# Patient Record
Sex: FEMALE | Race: WHITE | NOT HISPANIC OR LATINO | Employment: UNEMPLOYED | URBAN - METROPOLITAN AREA
[De-identification: names, ages, dates, MRNs, and addresses within clinical notes are randomized per-mention and may not be internally consistent; named-entity substitution may affect disease eponyms.]

---

## 2017-11-27 ENCOUNTER — OFFICE VISIT (OUTPATIENT)
Dept: URGENT CARE | Facility: CLINIC | Age: 42
End: 2017-11-27
Payer: MEDICAID

## 2017-11-27 VITALS
DIASTOLIC BLOOD PRESSURE: 73 MMHG | TEMPERATURE: 99 F | HEART RATE: 94 BPM | OXYGEN SATURATION: 98 % | BODY MASS INDEX: 25.9 KG/M2 | RESPIRATION RATE: 18 BRPM | SYSTOLIC BLOOD PRESSURE: 123 MMHG | HEIGHT: 67 IN | WEIGHT: 165 LBS

## 2017-11-27 DIAGNOSIS — S93.602A FOOT SPRAIN, LEFT, INITIAL ENCOUNTER: Primary | ICD-10-CM

## 2017-11-27 PROCEDURE — 99214 OFFICE O/P EST MOD 30 MIN: CPT | Mod: S$GLB,,, | Performed by: FAMILY MEDICINE

## 2017-11-27 RX ORDER — METHOCARBAMOL 750 MG/1
500 TABLET, FILM COATED ORAL 4 TIMES DAILY
COMMUNITY
End: 2018-07-12

## 2017-11-27 RX ORDER — LOPERAMIDE HYDROCHLORIDE 2 MG/1
2 CAPSULE ORAL 4 TIMES DAILY PRN
COMMUNITY
End: 2018-07-12 | Stop reason: SDUPTHER

## 2017-11-27 RX ORDER — PROMETHAZINE HYDROCHLORIDE 25 MG/1
25 TABLET ORAL EVERY 4 HOURS
COMMUNITY
End: 2018-10-03

## 2017-11-27 RX ORDER — IBUPROFEN 800 MG/1
800 TABLET ORAL EVERY 8 HOURS PRN
Qty: 60 TABLET | Refills: 2 | Status: SHIPPED | OUTPATIENT
Start: 2017-11-27 | End: 2018-10-03

## 2017-11-27 RX ORDER — DICYCLOMINE HYDROCHLORIDE 20 MG/1
20 TABLET ORAL EVERY 6 HOURS
COMMUNITY
End: 2018-07-12 | Stop reason: SDUPTHER

## 2017-11-27 RX ORDER — CYCLOBENZAPRINE HCL 10 MG
10 TABLET ORAL 3 TIMES DAILY PRN
COMMUNITY
End: 2018-07-12

## 2017-11-27 RX ORDER — GABAPENTIN 400 MG/1
400 CAPSULE ORAL 3 TIMES DAILY
COMMUNITY
End: 2018-07-12 | Stop reason: SDUPTHER

## 2017-11-27 RX ORDER — BACLOFEN 10 MG/1
10 TABLET ORAL 3 TIMES DAILY
COMMUNITY
End: 2018-07-12

## 2017-11-27 RX ORDER — NAPROXEN 500 MG/1
500 TABLET ORAL 2 TIMES DAILY
COMMUNITY
End: 2018-07-12

## 2017-11-27 NOTE — PATIENT INSTRUCTIONS
Return to clinic for a Xray as discussed.    Foot Sprain    A sprain is a stretching or tearing of the ligaments that hold a joint together. There are no broken bones. Sprains generally take from 3-6 weeks to heal. A sprain may be treated with a splint, walking cast, or special boot. Mild sprains may not need any additional support.  Home care  The following guidelines will help you care for your injury at home:  · Keep your leg elevated when sitting or lying down. This is very important during the first 48 hours to reduce swelling. Stay off the injured foot as much as possible until you can walk on it without pain. If needed, you may use crutches during the first week for this purpose. Crutches can be rented at many pharmacies or surgical/orthopedic supply stores.  · You may be given a cast shoe to wear to prevent movement in your foot. If not, you can use a sandal or any shoe that does not put pressure on the injured area until the swelling and pain go away. If using a sandal, be careful not to hit your foot against anything, since another injury could make the sprain worse.  · Apply an ice pack over the injured area for 15 to 20 minutes every 3 to 6 hours. You should do this for the first 24 to 48 hours. You can make an ice pack by filling a plastic bag that seals at the top with ice cubes and then wrapping it with a thin towel. Continue to use ice packs for relief of pain and swelling as needed. As the ice melts, avoid getting any wrap, splint, or cast wet. After 48 hours, apply heat from a warm shower or bath for 20 minutes several times daily. Alternating ice and heat may also be helpful.  · You may use over-the-counter pain medicine to control pain, unless another medicine was prescribed. If you have chronic liver or kidney disease or ever had a stomach ulcer or GI bleeding, talk with your healthcare provider before using these medicines.  · If you were given a splint or cast, keep it dry. Bathe with your  splint or cast well out of the water, protected with 2 large plastic bags, rubber-banded at the top end. If a fiberglass splint or cast gets wet, you can dry it with a hair dryer.  · You may return to sports after healing, when you can run without pain.  Follow-up care  Follow up with your healthcare provider as directed. Sometimes fractures dont show up on the first X-ray. Bruises and sprains can sometimes hurt as much as a fracture. These injuries can take time to heal completely. If your symptoms dont improve or they get worse, talk with your healthcare provider. You may need a repeat X-ray.  When to seek medical advice  Call your healthcare provider right away if any of these occur:  · The plaster cast or splint gets wet or soft  · The fiberglass cast or splint gets wet and does not dry for 24 hours  · Pain or swelling increases, or redness appears  · A bad odor comes from within the cast  · Fever of 100.4°F (38°C) or above lasting for 24 to 48 hours  · Toes on the injured foot become cold, blue, numb, or tingly  Date Last Reviewed: 11/20/2015  © 9140-5055 [employer]. [address] [place] [address], [place] [address]. All rights reserved. This information is not intended as a substitute for professional medical care. Always follow your healthcare professional's instructions.

## 2017-11-27 NOTE — PROGRESS NOTES
"Subjective:       Patient ID: Brissa Dietz is a 42 y.o. female.    Vitals:  height is 5' 7" (1.702 m) and weight is 74.8 kg (165 lb). Her temperature is 98.5 °F (36.9 °C). Her blood pressure is 123/73 and her pulse is 94. Her respiration is 18 and oxygen saturation is 98%.     Chief Complaint: Foot Injury    Initially twisted foot/ankle a couple weeks ago but re-injured left foot last night. Unable to bear weight since last night.      Foot Injury    The incident occurred more than 1 week ago. The incident occurred at home. The pain is present in the left foot. The pain is at a severity of 6/10. The pain is mild. The pain has been constant since onset. Pertinent negatives include no numbness. She reports no foreign bodies present. The symptoms are aggravated by movement, palpation and weight bearing. She has tried NSAIDs for the symptoms. The treatment provided no relief.     Review of Systems   Constitution: Negative for weakness and malaise/fatigue.   HENT: Negative for nosebleeds.    Cardiovascular: Negative for chest pain and syncope.   Respiratory: Negative for shortness of breath.    Musculoskeletal: Positive for joint pain and joint swelling. Negative for back pain and neck pain.   Gastrointestinal: Negative for abdominal pain.   Genitourinary: Negative for hematuria.   Neurological: Negative for dizziness and numbness.       Objective:      Physical Exam   Constitutional: She appears well-developed and well-nourished.   HENT:   Head: Normocephalic.   Mouth/Throat: Oropharynx is clear and moist.   Eyes: Pupils are equal, round, and reactive to light.   Neck: Normal range of motion.   Cardiovascular: Normal rate and regular rhythm.    Pulmonary/Chest: Effort normal.   Musculoskeletal: She exhibits tenderness.        Right foot: There is laceration.        Left foot: There is decreased range of motion, tenderness and swelling. There is no laceration.        Feet:        Assessment:       1. Foot sprain, " left, initial encounter        Plan:         Foot sprain, left, initial encounter  -     ibuprofen (ADVIL,MOTRIN) 800 MG tablet; Take 1 tablet (800 mg total) by mouth every 8 (eight) hours as needed for Pain.  Dispense: 60 tablet; Refill: 2  -     Cancel: X-Ray Foot 2 View Left; Future; Expected date: 11/27/2017      Patient Instructions     Return to clinic for a Xray as discussed.    Foot Sprain    A sprain is a stretching or tearing of the ligaments that hold a joint together. There are no broken bones. Sprains generally take from 3-6 weeks to heal. A sprain may be treated with a splint, walking cast, or special boot. Mild sprains may not need any additional support.  Home care  The following guidelines will help you care for your injury at home:  · Keep your leg elevated when sitting or lying down. This is very important during the first 48 hours to reduce swelling. Stay off the injured foot as much as possible until you can walk on it without pain. If needed, you may use crutches during the first week for this purpose. Crutches can be rented at many pharmacies or surgical/orthopedic supply stores.  · You may be given a cast shoe to wear to prevent movement in your foot. If not, you can use a sandal or any shoe that does not put pressure on the injured area until the swelling and pain go away. If using a sandal, be careful not to hit your foot against anything, since another injury could make the sprain worse.  · Apply an ice pack over the injured area for 15 to 20 minutes every 3 to 6 hours. You should do this for the first 24 to 48 hours. You can make an ice pack by filling a plastic bag that seals at the top with ice cubes and then wrapping it with a thin towel. Continue to use ice packs for relief of pain and swelling as needed. As the ice melts, avoid getting any wrap, splint, or cast wet. After 48 hours, apply heat from a warm shower or bath for 20 minutes several times daily. Alternating ice and heat may  also be helpful.  · You may use over-the-counter pain medicine to control pain, unless another medicine was prescribed. If you have chronic liver or kidney disease or ever had a stomach ulcer or GI bleeding, talk with your healthcare provider before using these medicines.  · If you were given a splint or cast, keep it dry. Bathe with your splint or cast well out of the water, protected with 2 large plastic bags, rubber-banded at the top end. If a fiberglass splint or cast gets wet, you can dry it with a hair dryer.  · You may return to sports after healing, when you can run without pain.  Follow-up care  Follow up with your healthcare provider as directed. Sometimes fractures dont show up on the first X-ray. Bruises and sprains can sometimes hurt as much as a fracture. These injuries can take time to heal completely. If your symptoms dont improve or they get worse, talk with your healthcare provider. You may need a repeat X-ray.  When to seek medical advice  Call your healthcare provider right away if any of these occur:  · The plaster cast or splint gets wet or soft  · The fiberglass cast or splint gets wet and does not dry for 24 hours  · Pain or swelling increases, or redness appears  · A bad odor comes from within the cast  · Fever of 100.4°F (38°C) or above lasting for 24 to 48 hours  · Toes on the injured foot become cold, blue, numb, or tingly  Date Last Reviewed: 11/20/2015  © 1263-1791 [employer]. [address] [place] [address], [place] [address]. All rights reserved. This information is not intended as a substitute for professional medical care. Always follow your healthcare professional's instructions.

## 2017-11-29 ENCOUNTER — TELEPHONE (OUTPATIENT)
Dept: URGENT CARE | Facility: CLINIC | Age: 42
End: 2017-11-29

## 2018-07-12 ENCOUNTER — OFFICE VISIT (OUTPATIENT)
Dept: FAMILY MEDICINE | Facility: HOSPITAL | Age: 43
End: 2018-07-12
Attending: FAMILY MEDICINE
Payer: MEDICAID

## 2018-07-12 VITALS
BODY MASS INDEX: 25.71 KG/M2 | HEIGHT: 67 IN | DIASTOLIC BLOOD PRESSURE: 70 MMHG | SYSTOLIC BLOOD PRESSURE: 115 MMHG | HEART RATE: 63 BPM | WEIGHT: 163.81 LBS

## 2018-07-12 DIAGNOSIS — Z00.00 HEALTHCARE MAINTENANCE: ICD-10-CM

## 2018-07-12 DIAGNOSIS — M54.12 CERVICAL RADICULOPATHY: Primary | ICD-10-CM

## 2018-07-12 DIAGNOSIS — F17.200 TOBACCO DEPENDENCE: ICD-10-CM

## 2018-07-12 DIAGNOSIS — K58.9 IRRITABLE BOWEL SYNDROME, UNSPECIFIED TYPE: ICD-10-CM

## 2018-07-12 DIAGNOSIS — M13.0 POLYARTHROPATHY: ICD-10-CM

## 2018-07-12 PROCEDURE — 99214 OFFICE O/P EST MOD 30 MIN: CPT | Performed by: FAMILY MEDICINE

## 2018-07-12 RX ORDER — VARENICLINE TARTRATE 0.5 (11)-1
KIT ORAL
Qty: 1 PACKAGE | Refills: 0 | Status: SHIPPED | OUTPATIENT
Start: 2018-07-12 | End: 2018-10-03

## 2018-07-12 RX ORDER — LOPERAMIDE HYDROCHLORIDE 2 MG/1
2 CAPSULE ORAL 4 TIMES DAILY PRN
Qty: 30 CAPSULE | Refills: 0 | Status: SHIPPED | OUTPATIENT
Start: 2018-07-12 | End: 2018-08-11

## 2018-07-12 RX ORDER — GABAPENTIN 400 MG/1
400 CAPSULE ORAL 3 TIMES DAILY
Qty: 90 CAPSULE | Refills: 0 | Status: SHIPPED | OUTPATIENT
Start: 2018-07-12 | End: 2018-08-13

## 2018-07-12 RX ORDER — DICYCLOMINE HYDROCHLORIDE 20 MG/1
20 TABLET ORAL EVERY 8 HOURS PRN
Qty: 30 TABLET | Refills: 0 | Status: SHIPPED | OUTPATIENT
Start: 2018-07-12 | End: 2018-08-11

## 2018-07-12 NOTE — PROGRESS NOTES
Subjective:       Patient ID: Brissa Dietz is a 43 y.o. female.    Chief Complaint: Back Pain    HPI     Ms. Dietz is a 44 yo female with PMHx of IBS who presents to the clinic complaining constant of pain that begins at her occipital protuberance that continues all the way down her back and extends into her bilateral upper and lower extremities.  The pain has been present for over one year.  The pain is described as a stabbing, throbbing pain that is 4/10 at its best and 10/10 at its worst.  She was previously prescribed gabapentin 400mg TID which she takes daily with moderate pain relief. She reports ice packs and movement make the pain worse.  Heating pads and rest mildly relieve pain.  She tried physical therapy in 2017 and states it made her pain worse. She reports occasional, episodic numbness of her upper and lower extremities.  Associated symptoms include hip and knee joint pain, nausea, and dizziness.     PMHx: IBS, uterine prolapse   LMP: 3/8/2018: has had irregular periods since age 37; Last PAP in 8/2016; no  abnormal PAPs known of  FHx: Dad- T2DM; Mom: COPD, heart disease; brother and sister relatively healthy; two children: healthy  SurgHx: tubal ligation 2006  SocHx: 1/2ppd smoker since age 15, no other tobacco, smokes marijuana daily, no other recreational drug use, denies ETOH      Review of Systems   Constitutional: Positive for fatigue. Negative for appetite change, chills and fever.   HENT: Negative for congestion, ear pain and hearing loss.    Eyes: Negative for pain, redness and itching.        Positive for blurry vision     Respiratory: Negative for chest tightness, shortness of breath and stridor.    Cardiovascular: Positive for leg swelling. Negative for chest pain and palpitations.   Gastrointestinal: Positive for nausea. Negative for abdominal pain, blood in stool and vomiting.   Endocrine: Positive for cold intolerance, polydipsia and polyuria. Negative for heat intolerance.    Genitourinary: Negative for dysuria, frequency and pelvic pain.   Musculoskeletal: Positive for arthralgias, back pain and neck pain. Negative for gait problem and joint swelling.   Skin: Negative for color change, pallor and rash.   Allergic/Immunologic: Negative for environmental allergies and food allergies.   Neurological: Positive for dizziness, light-headedness and headaches. Negative for syncope.   Hematological: Bruises/bleeds easily.   Psychiatric/Behavioral: Negative for agitation, self-injury, sleep disturbance and suicidal ideas.       Objective:      Vitals:    07/12/18 1517   BP: 115/70   Pulse: 63     Physical Exam   Constitutional: She is oriented to person, place, and time. She appears well-developed and well-nourished. No distress.   HENT:   Head: Normocephalic and atraumatic.   Nose: Nose normal.   Eyes: EOM are normal. Pupils are equal, round, and reactive to light. Right eye exhibits no discharge. Left eye exhibits no discharge. No scleral icterus.   Neck: Normal range of motion. Neck supple. No JVD present.   Cardiovascular: Normal rate, regular rhythm, normal heart sounds and intact distal pulses.    No murmur heard.  Pulmonary/Chest: Effort normal. She has wheezes (R upper lobe). She exhibits no tenderness.   Abdominal: Soft. Bowel sounds are normal. She exhibits no distension.   Musculoskeletal: Normal range of motion. She exhibits no edema or tenderness.   Neurological: She is alert and oriented to person, place, and time.   Skin: Skin is warm and dry. No erythema.   Psychiatric: She has a normal mood and affect. Her behavior is normal. Judgment and thought content normal.       Assessment:       1. Cervical radiculopathy    2. Irritable bowel syndrome, unspecified type    3. Healthcare maintenance    4. Polyarthropathy    5. Tobacco dependence        Plan:       Cervical radiculopathy        - MRI imaging of cervical spine from 2017 was reviewed which showed cervical stenosis  -      Ambulatory referral to Neurology        -     gabapentin (NEURONTIN) 400 MG capsule; Take 1 capsule (400 mg total) by mouth 3 (three) times daily.  Dispense: 90 capsule; Refill: 0    Irritable bowel syndrome, unspecified type  -     loperamide (IMODIUM) 2 mg capsule; Take 1 capsule (2 mg total) by mouth 4 (four) times daily as needed for Diarrhea.  Dispense: 30 capsule; Refill: 0  -     dicyclomine (BENTYL) 20 mg tablet; Take 1 tablet (20 mg total) by mouth every 8 (eight) hours as needed.  Dispense: 30 tablet; Refill: 0    Healthcare maintenance  -     CBC auto differential; Future; Expected date: 07/12/2018  -     Comprehensive metabolic panel; Future; Expected date: 07/12/2018  -     TSH; Future; Expected date: 07/12/2018  -     Lipid panel; Future; Expected date: 07/12/2018  -     Hemoglobin A1c; Future; Expected date: 07/12/2018  - Mammogram ordered.     Polyarthropathy  -     Rheumatoid factor; Future; Expected date: 07/12/2018  -     Sedimentation rate; Future; Expected date: 07/12/2018  -     MARGARET; Future; Expected date: 07/12/2018  -     C-reactive protein; Future; Expected date: 07/12/2018    Tobacco dependence  -     varenicline (CHANTIX STARTING MONTH ISAIAH) 0.5 mg (11)- 1 mg (42) tablet; Take one 0.5mg tab by mouth once daily X3 days,then increase to one 0.5mg tab twice daily X4 days,then increase to one 1mg tab twice daily  Dispense: 1 Package; Refill: 0          Follow-up in about 4 weeks (around 8/9/2018).        7/12/2018 5:25 PM Curt Johnson M.D.

## 2018-07-13 ENCOUNTER — LAB VISIT (OUTPATIENT)
Dept: LAB | Facility: HOSPITAL | Age: 43
End: 2018-07-13
Attending: FAMILY MEDICINE
Payer: MEDICAID

## 2018-07-13 DIAGNOSIS — Z00.00 HEALTHCARE MAINTENANCE: ICD-10-CM

## 2018-07-13 DIAGNOSIS — M13.0 POLYARTHROPATHY: ICD-10-CM

## 2018-07-13 LAB
ALBUMIN SERPL BCP-MCNC: 3.9 G/DL
ALP SERPL-CCNC: 60 U/L
ALT SERPL W/O P-5'-P-CCNC: 10 U/L
ANION GAP SERPL CALC-SCNC: 6 MMOL/L
AST SERPL-CCNC: 17 U/L
BASOPHILS # BLD AUTO: 0.02 K/UL
BASOPHILS NFR BLD: 0.4 %
BILIRUB SERPL-MCNC: 0.7 MG/DL
BUN SERPL-MCNC: 8 MG/DL
CALCIUM SERPL-MCNC: 9.3 MG/DL
CHLORIDE SERPL-SCNC: 110 MMOL/L
CHOLEST SERPL-MCNC: 189 MG/DL
CHOLEST/HDLC SERPL: 4.5 {RATIO}
CO2 SERPL-SCNC: 24 MMOL/L
CREAT SERPL-MCNC: 0.8 MG/DL
CRP SERPL-MCNC: 2 MG/L
DIFFERENTIAL METHOD: ABNORMAL
EOSINOPHIL # BLD AUTO: 0.1 K/UL
EOSINOPHIL NFR BLD: 1.8 %
ERYTHROCYTE [DISTWIDTH] IN BLOOD BY AUTOMATED COUNT: 14.2 %
EST. GFR  (AFRICAN AMERICAN): >60 ML/MIN/1.73 M^2
EST. GFR  (NON AFRICAN AMERICAN): >60 ML/MIN/1.73 M^2
ESTIMATED AVG GLUCOSE: 100 MG/DL
GLUCOSE SERPL-MCNC: 91 MG/DL
HBA1C MFR BLD HPLC: 5.1 %
HCT VFR BLD AUTO: 34.3 %
HDLC SERPL-MCNC: 42 MG/DL
HDLC SERPL: 22.2 %
HGB BLD-MCNC: 11.1 G/DL
LDLC SERPL CALC-MCNC: 116.8 MG/DL
LYMPHOCYTES # BLD AUTO: 1.8 K/UL
LYMPHOCYTES NFR BLD: 36.6 %
MCH RBC QN AUTO: 29.8 PG
MCHC RBC AUTO-ENTMCNC: 32.4 G/DL
MCV RBC AUTO: 92 FL
MONOCYTES # BLD AUTO: 0.3 K/UL
MONOCYTES NFR BLD: 5.4 %
NEUTROPHILS # BLD AUTO: 2.8 K/UL
NEUTROPHILS NFR BLD: 55.6 %
NONHDLC SERPL-MCNC: 147 MG/DL
PLATELET # BLD AUTO: 312 K/UL
PMV BLD AUTO: 10.3 FL
POTASSIUM SERPL-SCNC: 4.5 MMOL/L
PROT SERPL-MCNC: 7.3 G/DL
RBC # BLD AUTO: 3.72 M/UL
SODIUM SERPL-SCNC: 140 MMOL/L
TRIGL SERPL-MCNC: 151 MG/DL
TSH SERPL DL<=0.005 MIU/L-ACNC: 0.93 UIU/ML
WBC # BLD AUTO: 4.97 K/UL

## 2018-07-13 PROCEDURE — 86140 C-REACTIVE PROTEIN: CPT

## 2018-07-13 PROCEDURE — 83036 HEMOGLOBIN GLYCOSYLATED A1C: CPT

## 2018-07-13 PROCEDURE — 84443 ASSAY THYROID STIM HORMONE: CPT

## 2018-07-13 PROCEDURE — 85025 COMPLETE CBC W/AUTO DIFF WBC: CPT

## 2018-07-13 PROCEDURE — 80053 COMPREHEN METABOLIC PANEL: CPT

## 2018-07-13 PROCEDURE — 80061 LIPID PANEL: CPT

## 2018-07-13 PROCEDURE — 86431 RHEUMATOID FACTOR QUANT: CPT

## 2018-07-13 PROCEDURE — 85652 RBC SED RATE AUTOMATED: CPT

## 2018-07-13 PROCEDURE — 36415 COLL VENOUS BLD VENIPUNCTURE: CPT

## 2018-07-13 PROCEDURE — 86038 ANTINUCLEAR ANTIBODIES: CPT

## 2018-07-13 NOTE — PROGRESS NOTES
I have reviewed the notes, assessments, and/or procedures performed, I concur with her/his documentation of Brissa Dietz.

## 2018-07-16 LAB
ANA SER QL IF: NORMAL
RHEUMATOID FACT SERPL-ACNC: <10 IU/ML

## 2018-07-17 ENCOUNTER — HOSPITAL ENCOUNTER (OUTPATIENT)
Dept: RADIOLOGY | Facility: HOSPITAL | Age: 43
Discharge: HOME | End: 2018-07-17
Attending: FAMILY MEDICINE
Payer: MEDICAID

## 2018-07-17 DIAGNOSIS — Z00.00 HEALTHCARE MAINTENANCE: ICD-10-CM

## 2018-07-17 DIAGNOSIS — M13.0 POLYARTHROPATHY: Primary | ICD-10-CM

## 2018-07-17 PROCEDURE — 77063 BREAST TOMOSYNTHESIS BI: CPT | Mod: 26,,, | Performed by: RADIOLOGY

## 2018-07-17 PROCEDURE — 77067 SCR MAMMO BI INCL CAD: CPT | Mod: TC

## 2018-07-17 PROCEDURE — 77067 SCR MAMMO BI INCL CAD: CPT | Mod: 26,,, | Performed by: RADIOLOGY

## 2018-07-18 ENCOUNTER — TELEPHONE (OUTPATIENT)
Dept: RADIOLOGY | Facility: HOSPITAL | Age: 43
End: 2018-07-18

## 2018-07-25 ENCOUNTER — TELEPHONE (OUTPATIENT)
Dept: RADIOLOGY | Facility: HOSPITAL | Age: 43
End: 2018-07-25

## 2018-07-30 ENCOUNTER — HOSPITAL ENCOUNTER (OUTPATIENT)
Dept: RADIOLOGY | Facility: HOSPITAL | Age: 43
Discharge: HOME | End: 2018-07-30
Attending: FAMILY MEDICINE
Payer: MEDICAID

## 2018-07-30 DIAGNOSIS — R92.8 ABNORMAL MAMMOGRAM: ICD-10-CM

## 2018-07-30 DIAGNOSIS — R92.8 ABNORMAL FINDING ON BREAST IMAGING: ICD-10-CM

## 2018-07-30 PROCEDURE — 76642 ULTRASOUND BREAST LIMITED: CPT | Mod: 26,RT,, | Performed by: RADIOLOGY

## 2018-07-30 PROCEDURE — 77061 BREAST TOMOSYNTHESIS UNI: CPT | Mod: TC

## 2018-07-30 PROCEDURE — 76642 ULTRASOUND BREAST LIMITED: CPT | Mod: TC,RT

## 2018-07-30 PROCEDURE — 77061 BREAST TOMOSYNTHESIS UNI: CPT | Mod: 26,,, | Performed by: RADIOLOGY

## 2018-07-30 PROCEDURE — 77065 DX MAMMO INCL CAD UNI: CPT | Mod: 26,,, | Performed by: RADIOLOGY

## 2018-07-30 PROCEDURE — 77065 DX MAMMO INCL CAD UNI: CPT | Mod: TC

## 2018-08-13 ENCOUNTER — OFFICE VISIT (OUTPATIENT)
Dept: FAMILY MEDICINE | Facility: HOSPITAL | Age: 43
End: 2018-08-13
Attending: FAMILY MEDICINE
Payer: MEDICAID

## 2018-08-13 VITALS
DIASTOLIC BLOOD PRESSURE: 80 MMHG | WEIGHT: 161.81 LBS | HEIGHT: 67 IN | SYSTOLIC BLOOD PRESSURE: 135 MMHG | HEART RATE: 70 BPM | BODY MASS INDEX: 25.4 KG/M2

## 2018-08-13 DIAGNOSIS — K58.9 IRRITABLE BOWEL SYNDROME, UNSPECIFIED TYPE: ICD-10-CM

## 2018-08-13 DIAGNOSIS — M54.12 CERVICAL RADICULOPATHY: ICD-10-CM

## 2018-08-13 DIAGNOSIS — Z71.6 ENCOUNTER FOR SMOKING CESSATION COUNSELING: ICD-10-CM

## 2018-08-13 PROCEDURE — 99213 OFFICE O/P EST LOW 20 MIN: CPT | Performed by: FAMILY MEDICINE

## 2018-08-13 RX ORDER — GABAPENTIN 600 MG/1
600 TABLET ORAL 3 TIMES DAILY
Qty: 90 TABLET | Refills: 11 | Status: SHIPPED | OUTPATIENT
Start: 2018-08-13 | End: 2018-10-03

## 2018-08-13 RX ORDER — DICYCLOMINE HYDROCHLORIDE 20 MG/1
20 TABLET ORAL EVERY 6 HOURS
Qty: 120 TABLET | Refills: 0 | Status: SHIPPED | OUTPATIENT
Start: 2018-08-13 | End: 2018-09-12

## 2018-08-13 NOTE — PROGRESS NOTES
Subjective:       Patient ID: Brissa Dietz is a 43 y.o. female.    Chief Complaint: Upper back pain    HPI   44 yo female with PMHx of IBS and cervical radiculopathy presents to the clinic to follow up on her radiculopathy.  She had an MRI done that was positive for C4-C5 disc herniation with nucleas foramina stenosis. She was started on Gabapentin. She is doing better with the medication but wants to see if the doses can be increased as she is needing more at days. She also has an apt set up with Neurologist that's coming up as well. She also needs a referral for Mammogram. Denies N/V/F/C/CP/SOB.      Review of Systems   HENT: Negative.    Respiratory: Negative.    Cardiovascular: Negative.    Gastrointestinal: Negative.    Genitourinary: Negative.    Musculoskeletal: Positive for back pain and neck pain.   Skin: Negative.    Neurological: Positive for weakness and numbness.       Objective:      Vitals:    08/13/18 1031   BP: 135/80   Pulse: 70     Physical Exam   Constitutional: She is oriented to person, place, and time. She appears well-developed and well-nourished. No distress.   HENT:   Head: Normocephalic and atraumatic.   Nose: Nose normal.   Mouth/Throat: No oropharyngeal exudate.   Eyes: Conjunctivae are normal. Right eye exhibits no discharge. Left eye exhibits no discharge.   Neck: Normal range of motion. Neck supple. No JVD present. No tracheal deviation present.   Cardiovascular: Normal rate, regular rhythm, normal heart sounds and intact distal pulses. Exam reveals no gallop and no friction rub.   No murmur heard.  Pulmonary/Chest: Effort normal and breath sounds normal. No stridor. No respiratory distress. She has no wheezes. She has no rales. She exhibits no tenderness.   Abdominal: Soft. Bowel sounds are normal. She exhibits no distension and no mass. There is no tenderness. There is no guarding.   Musculoskeletal: Normal range of motion. She exhibits no edema, tenderness or deformity.    Neurological: She is alert and oriented to person, place, and time.   Skin: Skin is warm and dry. No rash noted. She is not diaphoretic. No erythema. No pallor.   Nursing note and vitals reviewed.      Assessment:       1. Cervical radiculopathy    2. Encounter for smoking cessation counseling    3. Irritable bowel syndrome, unspecified type        Plan:       Cervical radiculopathy  -     gabapentin (NEURONTIN) 600 MG tablet; Take 1 tablet (600 mg total) by mouth 3 (three) times daily.  Dispense: 90 tablet; Refill: 11  -      Pt has an apt with neurologist in few months.     Encounter for smoking cessation counseling  -       She was started on Chantix, but did not like taking it so stop taking it.   -       She has cut down to 5 cigarettes a day.     Irritable bowel syndrome, unspecified type  -     dicyclomine (BENTYL) 20 mg tablet; Take 1 tablet (20 mg total) by mouth every 6 (six) hours.  Dispense: 120 tablet; Refill: 0      Follow-up in about 4 weeks (around 9/10/2018) for Pap smear.     8/13/2018 12:36 PM Curt Johnson M.D.

## 2018-08-26 NOTE — PROGRESS NOTES
Case discussed with resident at time of visit.  I have reviewed and concur with the resident's evaluation, assessment, and plan.  Continue gabapentin for C4-5 disc herniation with radiculopathy.

## 2018-10-03 ENCOUNTER — OFFICE VISIT (OUTPATIENT)
Dept: FAMILY MEDICINE | Facility: HOSPITAL | Age: 43
End: 2018-10-03
Attending: FAMILY MEDICINE
Payer: MEDICAID

## 2018-10-03 VITALS
SYSTOLIC BLOOD PRESSURE: 113 MMHG | WEIGHT: 150.56 LBS | BODY MASS INDEX: 23.63 KG/M2 | DIASTOLIC BLOOD PRESSURE: 75 MMHG | HEIGHT: 67 IN | HEART RATE: 90 BPM

## 2018-10-03 DIAGNOSIS — H81.10 BENIGN PAROXYSMAL POSITIONAL VERTIGO, UNSPECIFIED LATERALITY: ICD-10-CM

## 2018-10-03 DIAGNOSIS — F17.200 SMOKING: ICD-10-CM

## 2018-10-03 DIAGNOSIS — M54.12 CERVICAL RADICULOPATHY: ICD-10-CM

## 2018-10-03 DIAGNOSIS — Z20.2 POSSIBLE EXPOSURE TO STD: Primary | ICD-10-CM

## 2018-10-03 PROCEDURE — 99213 OFFICE O/P EST LOW 20 MIN: CPT | Performed by: FAMILY MEDICINE

## 2018-10-03 RX ORDER — DICYCLOMINE HYDROCHLORIDE 20 MG/1
20 TABLET ORAL EVERY 6 HOURS
COMMUNITY
End: 2018-11-09 | Stop reason: SDUPTHER

## 2018-10-03 RX ORDER — NICOTINE 7MG/24HR
1 PATCH, TRANSDERMAL 24 HOURS TRANSDERMAL DAILY
Refills: 0 | COMMUNITY
Start: 2018-10-03 | End: 2019-01-11

## 2018-10-03 RX ORDER — ONDANSETRON 4 MG/1
TABLET, ORALLY DISINTEGRATING ORAL
Refills: 1 | COMMUNITY
Start: 2018-09-25 | End: 2024-02-20

## 2018-10-03 RX ORDER — OXYCODONE AND ACETAMINOPHEN 7.5; 325 MG/1; MG/1
TABLET ORAL
Refills: 0 | COMMUNITY
Start: 2018-09-25 | End: 2018-11-13

## 2018-10-03 RX ORDER — BUPROPION HYDROCHLORIDE 150 MG/1
TABLET, EXTENDED RELEASE ORAL
Refills: 1 | COMMUNITY
Start: 2018-09-25 | End: 2019-01-11 | Stop reason: SDUPTHER

## 2018-10-03 RX ORDER — MECLIZINE HCL 12.5 MG 12.5 MG/1
12.5 TABLET ORAL 3 TIMES DAILY PRN
Qty: 30 TABLET | Refills: 0 | Status: SHIPPED | OUTPATIENT
Start: 2018-10-03 | End: 2023-07-26

## 2018-10-03 RX ORDER — GABAPENTIN 400 MG/1
400 CAPSULE ORAL 3 TIMES DAILY
Qty: 90 CAPSULE | Refills: 11 | Status: SHIPPED | OUTPATIENT
Start: 2018-10-03 | End: 2019-09-04 | Stop reason: SDUPTHER

## 2018-10-03 NOTE — PROGRESS NOTES
Subjective:       Patient ID: Brissa Dietz is a 43 y.o. female.    Chief Complaint: Gynecologic Exam and dizziness    HPI   Brissa Dietz is a 43 y.o. female who presents to clinic for pap smear and dizziness. Pt reports recently being hospitalized at [facility] two weeks ago for LLQ pain and was subsequently diagnosed with IBS flare-up. Pt was given Percocet PRN pain for 10 days, but pt reports not taking it. Pt was also started on Bupropion for smoking cessation, and pt reports great improvement, only smoking 2-3 cigarettes/day now. She however reports strong urges in the evening and would like a Nicoderm patch to help through those cravings. Pt also states that the Gabapentin 600mg did not agree with her IBS and requests to go back to the 400mg.    Pt reports new dizziness yesterday while at home. No LOC, but pt reports she almost fell, + tinnitus, and room spinning. She denies SOB, chest pain, increased blurry vision, and hearing loss.       Review of Systems   Constitutional: Negative for activity change, appetite change and fatigue.   HENT: Positive for tinnitus. Negative for congestion, ear pain, hearing loss and sore throat.    Eyes: Negative for visual disturbance.   Respiratory: Negative for chest tightness, shortness of breath and wheezing.    Cardiovascular: Negative for chest pain and palpitations.   Gastrointestinal: Negative for constipation, diarrhea and vomiting.   Genitourinary: Negative for difficulty urinating, dysuria and enuresis.   Skin: Negative.    Neurological: Positive for dizziness. Negative for syncope, speech difficulty, weakness, light-headedness and headaches.   Psychiatric/Behavioral: Negative.        Objective:      Vitals:    10/03/18 1046   BP: 113/75   Pulse: 90     Physical Exam   Constitutional: She is oriented to person, place, and time. She appears well-developed and well-nourished. No distress.   HENT:   Head: Normocephalic and atraumatic.   Right Ear: External ear  normal.   Left Ear: External ear normal.   Eyes: Conjunctivae and EOM are normal. Right eye exhibits no discharge. Left eye exhibits no discharge. No scleral icterus.   + Apley test. Minimal nystagmus noted bilateral.   Neck: Normal range of motion. No JVD present.   Cardiovascular: Normal rate, regular rhythm, normal heart sounds and intact distal pulses. Exam reveals no friction rub.   No murmur heard.  Pulmonary/Chest: Effort normal and breath sounds normal. No respiratory distress. She has no wheezes. She has no rales.   Abdominal: Soft. She exhibits no distension and no mass. There is no tenderness. There is no rebound and no guarding.   Diminished bowel sounds to all quadrants.   Musculoskeletal: Normal range of motion.   Neurological: She is alert and oriented to person, place, and time.   Skin: Skin is warm. Capillary refill takes less than 2 seconds. She is not diaphoretic.   Psychiatric: She has a normal mood and affect. Her behavior is normal.         Orthostatic BP:  Lying down: 116/77  Sittin/79  Standin/78    Assessment:       1. Possible exposure to STD    2. Cervical radiculopathy    3. Benign paroxysmal positional vertigo, unspecified laterality    4. Smoking        Plan:       Possible exposure to STD  - Pap smear done in the clinic.  - GC/Chlamydia ordered  - RPR and HIV ordered as well    IBS  - Continue Dicyclomine.  - Continue Loperamide.  - Zofran PRN nausea.    Cervical radiculopathy  - Discontinue Percocets. Restart Gabapentin 400mg TID.    Benign paroxysmal positional vertigo, unspecified laterality  - Positive Apley test.  - Orthostatics negative.  - Start Meclizine BID.  - Pt given handout to perform epley maneuvers at home.     Smoking  - Continue Bupropion.  - Start Nicoderm patches PRN cravings/urges.    Follow-up in about 4 weeks (around 10/31/2018).        10/3/2018 Curt Johnson D.O.

## 2018-11-09 RX ORDER — DICYCLOMINE HYDROCHLORIDE 20 MG/1
20 TABLET ORAL EVERY 6 HOURS
Qty: 56 TABLET | Refills: 0 | Status: SHIPPED | OUTPATIENT
Start: 2018-11-09 | End: 2018-11-13 | Stop reason: SDUPTHER

## 2018-11-13 ENCOUNTER — OFFICE VISIT (OUTPATIENT)
Dept: FAMILY MEDICINE | Facility: HOSPITAL | Age: 43
End: 2018-11-13
Attending: FAMILY MEDICINE
Payer: MEDICAID

## 2018-11-13 VITALS
SYSTOLIC BLOOD PRESSURE: 108 MMHG | DIASTOLIC BLOOD PRESSURE: 70 MMHG | WEIGHT: 165.13 LBS | HEART RATE: 81 BPM | BODY MASS INDEX: 25.92 KG/M2 | HEIGHT: 67 IN

## 2018-11-13 DIAGNOSIS — M54.12 CERVICAL RADICULOPATHY: ICD-10-CM

## 2018-11-13 DIAGNOSIS — Z20.2 POSSIBLE EXPOSURE TO STD: Primary | ICD-10-CM

## 2018-11-13 DIAGNOSIS — K58.9 IRRITABLE BOWEL SYNDROME, UNSPECIFIED TYPE: ICD-10-CM

## 2018-11-13 PROCEDURE — 99213 OFFICE O/P EST LOW 20 MIN: CPT | Performed by: FAMILY MEDICINE

## 2018-11-13 RX ORDER — DICYCLOMINE HYDROCHLORIDE 20 MG/1
20 TABLET ORAL EVERY 6 HOURS
Qty: 56 TABLET | Refills: 1 | Status: SHIPPED | OUTPATIENT
Start: 2018-11-13 | End: 2018-11-27

## 2018-11-13 RX ORDER — POLYETHYLENE GLYCOL 3350 17 G/17G
POWDER, FOR SOLUTION ORAL
Refills: 11 | COMMUNITY
Start: 2018-10-08 | End: 2019-02-15

## 2018-11-13 NOTE — PROGRESS NOTES
Subjective:       Patient ID: Brissa Dietz is a 43 y.o. female.    Chief Complaint: Pap smear follow up    HPI   42 yo female presents to follow up on her pap smear and STD exam. She had a pap done along with some labs for STD. She did get her labs done as the lab was closed the day she went there. She has no complaints today. She has no vaginal discharge or pain or abnormal bleeding.   She was seen by the neurologist, who is getting an EMG done for her radiculopathy.   She also is doing well with meclizine and epley maneuvers for her BPPV. She needs a refill on her IBS medication.     Review of Systems   Constitutional: Negative.    HENT: Negative.    Respiratory: Negative.    Cardiovascular: Negative.    Gastrointestinal: Negative.    Genitourinary: Negative.    Musculoskeletal: Positive for arthralgias and myalgias.   Neurological: Negative.    Psychiatric/Behavioral: Negative.        Objective:      Vitals:    11/13/18 1530   BP: 108/70   Pulse: 81     Physical Exam   Constitutional: She is oriented to person, place, and time. She appears well-developed and well-nourished. No distress.   HENT:   Head: Normocephalic and atraumatic.   Nose: Nose normal.   Mouth/Throat: No oropharyngeal exudate.   Eyes: Conjunctivae are normal. Right eye exhibits no discharge. Left eye exhibits no discharge.   Neck: Normal range of motion. Neck supple. No JVD present. No tracheal deviation present.   Cardiovascular: Normal rate, regular rhythm, normal heart sounds and intact distal pulses. Exam reveals no gallop and no friction rub.   No murmur heard.  Pulmonary/Chest: Effort normal and breath sounds normal. No stridor. No respiratory distress. She has no wheezes. She has no rales. She exhibits no tenderness.   Abdominal: Soft. Bowel sounds are normal. She exhibits no distension and no mass. There is no tenderness. There is no guarding.   Musculoskeletal: Normal range of motion. She exhibits no edema, tenderness or deformity.    Neurological: She is alert and oriented to person, place, and time.   Skin: Skin is warm and dry. No rash noted. She is not diaphoretic. No erythema. No pallor.   Nursing note and vitals reviewed.      Assessment:       1. Possible exposure to STD    2. Cervical radiculopathy    3. Irritable bowel syndrome, unspecified type        Plan:       Possible exposure to STD       - Lab results reviewed with the pt and negative. She will go to the lab today to have her other labs drawn.     Cervical radiculopathy       - Pt seen by the neurology and has an EMG scheduled.     Irritable bowel syndrome, unspecified type  -     dicyclomine (BENTYL) 20 mg tablet; Take 1 tablet (20 mg total) by mouth every 6 (six) hours. for 14 days  Dispense: 56 tablet; Refill: 1      Follow-up in about 4 weeks (around 12/11/2018).        11/13/2018 3:54 PM Curt Johnson M.D.

## 2018-11-13 NOTE — PROGRESS NOTES
I have reviewed the notes, assessments, and/or procedures performed by Dr. Johnson, I concur with her/his documentation of Brissa Dietz.

## 2018-12-04 ENCOUNTER — OFFICE VISIT (OUTPATIENT)
Dept: URGENT CARE | Facility: CLINIC | Age: 43
End: 2018-12-04
Payer: MEDICAID

## 2018-12-04 VITALS
RESPIRATION RATE: 18 BRPM | WEIGHT: 165 LBS | SYSTOLIC BLOOD PRESSURE: 95 MMHG | DIASTOLIC BLOOD PRESSURE: 57 MMHG | OXYGEN SATURATION: 98 % | HEART RATE: 73 BPM | TEMPERATURE: 98 F | HEIGHT: 67 IN | BODY MASS INDEX: 25.9 KG/M2

## 2018-12-04 DIAGNOSIS — S63.613A SPRAIN OF LEFT MIDDLE FINGER, INITIAL ENCOUNTER: Primary | ICD-10-CM

## 2018-12-04 PROCEDURE — 99213 OFFICE O/P EST LOW 20 MIN: CPT | Mod: S$GLB,,, | Performed by: NURSE PRACTITIONER

## 2018-12-04 PROCEDURE — 73130 X-RAY EXAM OF HAND: CPT | Mod: LT,S$GLB,, | Performed by: RADIOLOGY

## 2018-12-04 RX ORDER — IBUPROFEN 600 MG/1
600 TABLET ORAL EVERY 6 HOURS PRN
Qty: 60 TABLET | Refills: 0 | Status: SHIPPED | OUTPATIENT
Start: 2018-12-04 | End: 2019-01-11

## 2018-12-04 NOTE — PROGRESS NOTES
"Subjective:       Patient ID: Brissa Dietz is a 43 y.o. female.    Vitals:  height is 5' 7" (1.702 m) and weight is 74.8 kg (165 lb). Her temperature is 98 °F (36.7 °C). Her blood pressure is 95/57 (abnormal) and her pulse is 73. Her respiration is 18 and oxygen saturation is 98%.     Chief Complaint: Hand Pain    Presented to  with c/o left middle finger pain. States she slammed it in door.       Hand Pain    The incident occurred 2 days ago. The incident occurred at home. The injury mechanism was a direct blow. The pain is present in the left fingers. The quality of the pain is described as aching. The pain does not radiate. The pain is at a severity of 7/10. The pain is mild. The pain has been constant since the incident. The symptoms are aggravated by movement, lifting and palpation. She has tried immobilization and NSAIDs for the symptoms. The treatment provided mild relief.       Constitution: Negative for fatigue.   HENT: Negative for facial swelling and facial trauma.    Neck: Negative for neck stiffness.   Cardiovascular: Negative for chest trauma.   Eyes: Negative for eye trauma, double vision and blurred vision.   Gastrointestinal: Negative for abdominal trauma, abdominal pain and rectal bleeding.   Genitourinary: Negative for hematuria, missed menses, genital trauma and pelvic pain.   Musculoskeletal: Positive for trauma, joint pain and joint swelling. Negative for pain and abnormal ROM of joint.   Skin: Negative for color change, wound, abrasion, laceration and bruising.   Neurological: Negative for dizziness, history of vertigo, light-headedness, coordination disturbances, altered mental status and loss of consciousness.   Hematologic/Lymphatic: Negative for history of bleeding disorder.   Psychiatric/Behavioral: Negative for altered mental status.       Objective:      Physical Exam   Constitutional: She is oriented to person, place, and time. She appears well-developed and well-nourished. She is " cooperative.  Non-toxic appearance. She does not appear ill. No distress.   HENT:   Head: Normocephalic and atraumatic.   Right Ear: Hearing, tympanic membrane, external ear and ear canal normal.   Left Ear: Hearing, tympanic membrane, external ear and ear canal normal.   Nose: Nose normal. No mucosal edema, rhinorrhea or nasal deformity. No epistaxis. Right sinus exhibits no maxillary sinus tenderness and no frontal sinus tenderness. Left sinus exhibits no maxillary sinus tenderness and no frontal sinus tenderness.   Mouth/Throat: Uvula is midline, oropharynx is clear and moist and mucous membranes are normal. No trismus in the jaw. Normal dentition. No uvula swelling. No posterior oropharyngeal erythema.   Eyes: Conjunctivae and lids are normal. Right eye exhibits no discharge. Left eye exhibits no discharge. No scleral icterus.   Sclera clear bilat   Neck: Trachea normal, normal range of motion, full passive range of motion without pain and phonation normal. Neck supple.   Cardiovascular: Normal rate, regular rhythm, normal heart sounds, intact distal pulses and normal pulses.   Pulmonary/Chest: Effort normal and breath sounds normal. No respiratory distress.   Abdominal: Soft. Normal appearance and bowel sounds are normal. She exhibits no distension, no pulsatile midline mass and no mass. There is no tenderness.   Musculoskeletal: Normal range of motion. She exhibits no edema or deformity.        Hands:  Tenderness to left middle finger. No obvious deformity, no decreased ROM.    Neurological: She is alert and oriented to person, place, and time. She exhibits normal muscle tone. Coordination normal.   Skin: Skin is warm, dry and intact. She is not diaphoretic. No pallor.   Psychiatric: She has a normal mood and affect. Her speech is normal and behavior is normal. Judgment and thought content normal. Cognition and memory are normal.   Nursing note and vitals reviewed.      Assessment:       1. Sprain of left  middle finger, initial encounter        Plan:         Sprain of left middle finger, initial encounter  -     XR HAND COMPLETE 3 VIEW LEFT; Future; Expected date: 12/04/2018  -     ibuprofen (ADVIL,MOTRIN) 600 MG tablet; Take 1 tablet (600 mg total) by mouth every 6 (six) hours as needed.  Dispense: 60 tablet; Refill: 0     Xr Hand Complete 3 View Left    Result Date: 12/4/2018  EXAMINATION: XR HAND COMPLETE 3 VIEW LEFT CLINICAL HISTORY: . Injury, unspecified, initial encounter TECHNIQUE: PA, lateral, and oblique views of the left hand were performed. COMPARISON: None FINDINGS: Normal alignment. Normal mineralization. No fracture, no osseous lesions. No degenerative change. The soft tissues appear normal.     No abnormality seen Electronically signed by: Maribeth Garza MD Date:    12/04/2018 Time:    16:55    Please follow up with your Primary care provider within 2-5 days if your signs and symptoms have not resolved or worsen.     If your condition worsens or fails to improve we recommend that you receive another evaluation at the emergency room immediately or contact your primary medical clinic to discuss your concerns.   You must understand that you have received an Urgent Care treatment only and that you may be released before all of your medical problems are known or treated. You, the patient, will arrange for follow up care as instructed.     RED FLAGS/WARNING SYMPTOMS DISCUSSED WITH PATIENT THAT WOULD WARRANT EMERGENT MEDICAL ATTENTION. PATIENT VERBALIZED UNDERSTANDING.     Finger Sprain  A sprain is a stretching or tearing of the ligaments that hold a joint together. There are no broken bones. Sprains take 3 to 6 weeks to heal.  A sprained finger may be treated with a splint or buddy tape. This is when you tape the injured finger to the one next to it for support. Minor sprains may require no additional support.  Home care  · Keep your hand elevated to reduce pain and swelling. This is very important  during the first 48 hours.  · Apply an ice pack over the injured area for 15 to 20 minutes every 3 to 6 hours. You should do this for the first 24 to 48 hours. You can make an ice pack by filling a plastic bag that seals at the top with ice cubes and then wrapping it with a thin towel. Continue the use of ice packs for relief of pain and swelling as needed. As the ice melts, be careful to avoid getting any wrap or splint wet. After 48 hours, apply heat (warm shower or warm bath) for 15 to 20 minutes several times a day, or alternate ice and heat.  · If buddy tape was applied and it becomes wet or dirty, change it. You may replace it with paper, plastic or cloth tape. Cloth tape and paper tapes must be kept dry. Apply gauze or cotton padding between the fingers, especially at the webbed space. This will help prevent the skin from getting moist and breaking down. Keep the buddy tape in place for at least 4 weeks, or as instructed by your healthcare provider.  · If a splint was applied, wear it for the time advised.  · You may use over-the-counter pain medicine to control pain, unless another pain medicine was prescribed. If you have chronic liver or kidney disease or ever had a stomach ulcer or GI bleeding, talk with your healthcare provider before using these medicines.  Follow-up care  Follow up with your healthcare provider as directed. Finger joints will become stiff if immobile for too long. If a splint was applied, ask your healthcare provider when it is safe to begin range-of-motion exercises.  Sometimes fractures dont show up on the first X-ray. Bruises and sprains can sometimes hurt as much as a fracture. These injuries can take time to heal completely. If your symptoms dont improve or they get worse, talk with your healthcare provider. You may need a repeat X-ray. If X-rays were taken, you will be told of any new findings that may affect your care.  When to seek medical advice  Call your healthcare  provider right away if any of these occur:  · Pain or swelling increases  · Fingers or hand becomes cold, blue, numb, or tingly  Date Last Reviewed: 11/20/2015  © 0656-0022 "[employer]. [address] [place] [address], [place] [address]. All rights reserved. This information is not intended as a substitute for professional medical care. Always follow your healthcare professional's instructions.

## 2018-12-04 NOTE — PATIENT INSTRUCTIONS
Please follow up with your Primary care provider within 2-5 days if your signs and symptoms have not resolved or worsen.     If your condition worsens or fails to improve we recommend that you receive another evaluation at the emergency room immediately or contact your primary medical clinic to discuss your concerns.   You must understand that you have received an Urgent Care treatment only and that you may be released before all of your medical problems are known or treated. You, the patient, will arrange for follow up care as instructed.     RED FLAGS/WARNING SYMPTOMS DISCUSSED WITH PATIENT THAT WOULD WARRANT EMERGENT MEDICAL ATTENTION. PATIENT VERBALIZED UNDERSTANDING.     Finger Sprain  A sprain is a stretching or tearing of the ligaments that hold a joint together. There are no broken bones. Sprains take 3 to 6 weeks to heal.  A sprained finger may be treated with a splint or buddy tape. This is when you tape the injured finger to the one next to it for support. Minor sprains may require no additional support.  Home care  · Keep your hand elevated to reduce pain and swelling. This is very important during the first 48 hours.  · Apply an ice pack over the injured area for 15 to 20 minutes every 3 to 6 hours. You should do this for the first 24 to 48 hours. You can make an ice pack by filling a plastic bag that seals at the top with ice cubes and then wrapping it with a thin towel. Continue the use of ice packs for relief of pain and swelling as needed. As the ice melts, be careful to avoid getting any wrap or splint wet. After 48 hours, apply heat (warm shower or warm bath) for 15 to 20 minutes several times a day, or alternate ice and heat.  · If buddy tape was applied and it becomes wet or dirty, change it. You may replace it with paper, plastic or cloth tape. Cloth tape and paper tapes must be kept dry. Apply gauze or cotton padding between the fingers, especially at the webbed space. This will help prevent  the skin from getting moist and breaking down. Keep the erin tape in place for at least 4 weeks, or as instructed by your healthcare provider.  · If a splint was applied, wear it for the time advised.  · You may use over-the-counter pain medicine to control pain, unless another pain medicine was prescribed. If you have chronic liver or kidney disease or ever had a stomach ulcer or GI bleeding, talk with your healthcare provider before using these medicines.  Follow-up care  Follow up with your healthcare provider as directed. Finger joints will become stiff if immobile for too long. If a splint was applied, ask your healthcare provider when it is safe to begin range-of-motion exercises.  Sometimes fractures dont show up on the first X-ray. Bruises and sprains can sometimes hurt as much as a fracture. These injuries can take time to heal completely. If your symptoms dont improve or they get worse, talk with your healthcare provider. You may need a repeat X-ray. If X-rays were taken, you will be told of any new findings that may affect your care.  When to seek medical advice  Call your healthcare provider right away if any of these occur:  · Pain or swelling increases  · Fingers or hand becomes cold, blue, numb, or tingly  Date Last Reviewed: 11/20/2015  © 6393-5664 [employer]. [address] [place] [address], [place] [address]. All rights reserved. This information is not intended as a substitute for professional medical care. Always follow your healthcare professional's instructions.

## 2019-01-11 ENCOUNTER — TELEPHONE (OUTPATIENT)
Dept: RADIOLOGY | Facility: HOSPITAL | Age: 44
End: 2019-01-11

## 2019-01-11 ENCOUNTER — HOSPITAL ENCOUNTER (OUTPATIENT)
Dept: RADIOLOGY | Facility: HOSPITAL | Age: 44
Discharge: HOME | End: 2019-01-11
Attending: FAMILY MEDICINE
Payer: MEDICAID

## 2019-01-11 ENCOUNTER — OFFICE VISIT (OUTPATIENT)
Dept: FAMILY MEDICINE | Facility: HOSPITAL | Age: 44
End: 2019-01-11
Attending: FAMILY MEDICINE
Payer: MEDICAID

## 2019-01-11 VITALS
DIASTOLIC BLOOD PRESSURE: 76 MMHG | BODY MASS INDEX: 27.96 KG/M2 | SYSTOLIC BLOOD PRESSURE: 117 MMHG | HEART RATE: 80 BPM | WEIGHT: 178.13 LBS | HEIGHT: 67 IN

## 2019-01-11 DIAGNOSIS — S63.613D SPRAIN OF LEFT MIDDLE FINGER, UNSPECIFIED SITE OF FINGER, SUBSEQUENT ENCOUNTER: ICD-10-CM

## 2019-01-11 DIAGNOSIS — Z71.6 ENCOUNTER FOR SMOKING CESSATION COUNSELING: ICD-10-CM

## 2019-01-11 DIAGNOSIS — K58.9 IRRITABLE BOWEL SYNDROME, UNSPECIFIED TYPE: ICD-10-CM

## 2019-01-11 DIAGNOSIS — S63.613D SPRAIN OF LEFT MIDDLE FINGER, UNSPECIFIED SITE OF FINGER, SUBSEQUENT ENCOUNTER: Primary | ICD-10-CM

## 2019-01-11 PROCEDURE — 73130 X-RAY EXAM OF HAND: CPT | Mod: 26,LT,, | Performed by: RADIOLOGY

## 2019-01-11 PROCEDURE — 73130 XR HAND COMPLETE 3 VIEW LEFT: ICD-10-PCS | Mod: 26,LT,, | Performed by: RADIOLOGY

## 2019-01-11 PROCEDURE — 99214 OFFICE O/P EST MOD 30 MIN: CPT | Mod: 25 | Performed by: FAMILY MEDICINE

## 2019-01-11 PROCEDURE — 73130 X-RAY EXAM OF HAND: CPT | Mod: TC,FY,LT

## 2019-01-11 RX ORDER — DICYCLOMINE HYDROCHLORIDE 20 MG/1
20 TABLET ORAL EVERY 6 HOURS
Qty: 120 TABLET | Refills: 0 | Status: SHIPPED | OUTPATIENT
Start: 2019-01-11 | End: 2019-02-10

## 2019-01-11 RX ORDER — BUPROPION HYDROCHLORIDE 150 MG/1
150 TABLET, EXTENDED RELEASE ORAL 2 TIMES DAILY
Qty: 60 TABLET | Refills: 2 | Status: SHIPPED | OUTPATIENT
Start: 2019-01-11 | End: 2021-11-09

## 2019-01-11 RX ORDER — LOPERAMIDE HYDROCHLORIDE 2 MG/1
2 CAPSULE ORAL 4 TIMES DAILY PRN
Qty: 30 CAPSULE | Refills: 0 | Status: SHIPPED | OUTPATIENT
Start: 2019-01-11 | End: 2019-01-21

## 2019-01-11 NOTE — PROGRESS NOTES
Subjective:       Patient ID: Brissa Dietz is a 43 y.o. female.    Chief Complaint: Hand Pain    Patient is a 43 y.o. female who has no past medical history on file. presents today with complaints of continued pain from sprain of her left middle finger.  Patient injured her left middle finger on 12/1 and went to urgent care on 12/4 where she had a x ray that showed no fracture and patient was diagnosed with a finger sprain.  Patient unable to flex her middle finger without significant pain today.  Negative for fever, headache, nausea, vomiting, chest pain, SOB, abdominal pain, dysuria, constipation, diarrhea.  Patient has no additional complaints.            Review of Systems   Constitutional: Negative for chills, diaphoresis, fatigue and fever.   HENT: Negative for congestion, ear pain, postnasal drip, rhinorrhea and sore throat.    Eyes: Negative for pain and discharge.   Respiratory: Negative for cough, shortness of breath and wheezing.    Cardiovascular: Negative for chest pain, palpitations and leg swelling.   Gastrointestinal: Negative for abdominal distention, abdominal pain, blood in stool, constipation, diarrhea, nausea and vomiting.   Endocrine: Negative for polydipsia and polyuria.   Genitourinary: Negative for dysuria and hematuria.   Musculoskeletal: Positive for arthralgias and joint swelling. Negative for back pain, myalgias and neck pain.   Skin: Negative.    Allergic/Immunologic: Negative.    Neurological: Negative for dizziness, weakness, light-headedness, numbness and headaches.   Hematological: Negative.    Psychiatric/Behavioral: Negative.        Objective:      Vitals:    01/11/19 1033   BP: 117/76   Pulse: 80     Physical Exam   Constitutional: She is oriented to person, place, and time. She appears well-developed and well-nourished. No distress.   HENT:   Head: Normocephalic and atraumatic.   Right Ear: External ear normal.   Left Ear: External ear normal.   Nose: Nose normal.    Mouth/Throat: Oropharynx is clear and moist. No oropharyngeal exudate.   Eyes: Conjunctivae and EOM are normal. Pupils are equal, round, and reactive to light. Right eye exhibits no discharge. Left eye exhibits no discharge. No scleral icterus.   Neck: Normal range of motion. No JVD present. No thyromegaly present.   Cardiovascular: Normal rate, regular rhythm, normal heart sounds and intact distal pulses. Exam reveals no gallop and no friction rub.   No murmur heard.  Pulmonary/Chest: Effort normal and breath sounds normal. No respiratory distress. She has no wheezes. She has no rales. She exhibits no tenderness.   Abdominal: Soft. Bowel sounds are normal. She exhibits no distension and no mass. There is no tenderness. There is no rebound and no guarding. No hernia.   Musculoskeletal:        Left hand: She exhibits decreased range of motion.   Neurological: She is alert and oriented to person, place, and time. No cranial nerve deficit.   Skin: Skin is warm and dry. She is not diaphoretic.   Psychiatric: She has a normal mood and affect. Her behavior is normal.   Vitals reviewed.      Assessment:       1. Sprain of left middle finger, unspecified site of finger, subsequent encounter    2. Irritable bowel syndrome, unspecified type    3. Encounter for smoking cessation counseling        Plan:       Sprain of left middle finger, unspecified site of finger, subsequent encounter  -     X-Ray Hand Complete Left; Future; Expected date: 01/11/2019  -     Ambulatory Referral to Physical/Occupational Therapy    Irritable bowel syndrome, unspecified type  -     dicyclomine (BENTYL) 20 mg tablet; Take 1 tablet (20 mg total) by mouth every 6 (six) hours.  Dispense: 120 tablet; Refill: 0  -     loperamide (IMODIUM) 2 mg capsule; Take 1 capsule (2 mg total) by mouth 4 (four) times daily as needed for Diarrhea.  Dispense: 30 capsule; Refill: 0    Encounter for smoking cessation counseling  -     buPROPion (WELLBUTRIN SR) 150 MG  TBSR 12 hr tablet; Take 1 tablet (150 mg total) by mouth 2 (two) times daily.  Dispense: 60 tablet; Refill: 2      Follow-up in about 1 week (around 1/18/2019).

## 2019-01-14 ENCOUNTER — CLINICAL SUPPORT (OUTPATIENT)
Dept: REHABILITATION | Facility: HOSPITAL | Age: 44
End: 2019-01-14
Attending: FAMILY MEDICINE
Payer: MEDICAID

## 2019-01-14 DIAGNOSIS — R29.898 DECREASED GRIP STRENGTH OF LEFT HAND: ICD-10-CM

## 2019-01-14 DIAGNOSIS — M25.60 RANGE OF MOTION DEFICIT: Primary | ICD-10-CM

## 2019-01-14 DIAGNOSIS — R29.898 DECREASED PINCH STRENGTH: ICD-10-CM

## 2019-01-14 DIAGNOSIS — M79.645 FINGER PAIN, LEFT: ICD-10-CM

## 2019-01-14 PROCEDURE — 97165 OT EVAL LOW COMPLEX 30 MIN: CPT | Mod: PN

## 2019-01-14 NOTE — PATIENT INSTRUCTIONS
Edema Reduction (Contrast Baths)        Have 2 containers deep enough for involved area to be immersed. Fill one with warm water and the other with slightly chilled water.  Soak in warm water for 1 to 2 minutes; cold for ½ to 1 minute.  Alternate and continue for 10 minutes. End in warm water.    Copyright © I. All rights reserved.   Edema Reduction (Retrograde Massage)        A. Enclose tip of finger with other hand and slide toward wrist.  B. For larger areas, massage toward the body in one direction only.  Repeat _20___ times. Do _1-2___ sessions per day.    Copyright © I. All rights reserved.   DIP Flexion (Active Blocked)        Hold long finger firmly at the middle so that only the tip joint can bend. Hold _3___ seconds.  Repeat __10__ times. Do __3__ sessions per day.    Copyright © I. All rights reserved.   PIP Flexion (Active Blocked)        Hold large knuckle straight using other hand. Bend middle joint of long  finger as far as possible. Hold _3__ seconds.  Repeat __10__ times. Do _3___ sessions per day.  Activity: Curl fingers around a jar cap.*    Copyright © I. All rights reserved.   PIP Flexion (Active Isolated)        Bend only middle joint of long finger, keeping other fingers straight with other hand.  Repeat _10_ times. Do _3___ sessions per day.    Copyright © I. All rights reserved.     Flexor Tendon Gliding (Active Hook Fist)        With fingers and knuckles straight, bend middle and tip joints. Do not bend large knuckles.  Repeat __10__ times. Do _3___ sessions per day.    Copyright © I. All rights reserved.   Flexor Tendon Gliding (Active Full Fist)        Straighten all fingers, then make a fist, bending all joints.  Repeat __10__ times. Do _3___ sessions per day.    Copyright © I. All rights reserved.   Flexor Tendon Gliding (Active Straight Fist)        Start with fingers straight. Bend knuckles and middle joints. Keep fingertip joints straight to touch base of palm.  Repeat  __10__ times. Do _3___ sessions per day.    Copyright © I. All rights reserved.

## 2019-01-14 NOTE — PLAN OF CARE
Occupational Therapy Evaluation    Patient: Brissa Dietz  Date of Evaluation: 01/14/2019  Referring Physician: Cameron Lyman MD    Diagnosis:   Encounter Diagnoses   Name Primary?    Range of motion deficit Yes    Finger pain, left     Decreased  strength of left hand     Decreased pinch strength      Referral Orders: Eval and treat    Start Time: 3:30 pm  End Time: 4:15  Total Time: 45 min  Group Time: -    Age: 43 y.o.  Sex: female  Hand dominance: right    Occupation: stay at home mom  Working presently: No    Date of Injury: 12/3/18  Date of Surgery: n/a  Involved areas: left middle finger    Mechanism of Injury: smashed her finger in house door.  No past medical history on file.   Pinch nerve in neck and back  IBS  Current Outpatient Medications   Medication Sig    buPROPion (WELLBUTRIN SR) 150 MG TBSR 12 hr tablet Take 1 tablet (150 mg total) by mouth 2 (two) times daily.    dicyclomine (BENTYL) 20 mg tablet Take 1 tablet (20 mg total) by mouth every 6 (six) hours.    gabapentin (NEURONTIN) 400 MG capsule Take 1 capsule (400 mg total) by mouth 3 (three) times daily.    loperamide (IMODIUM) 2 mg capsule Take 1 capsule (2 mg total) by mouth 4 (four) times daily as needed for Diarrhea.    meclizine (ANTIVERT) 12.5 mg tablet Take 1 tablet (12.5 mg total) by mouth 3 (three) times daily as needed.    ondansetron (ZOFRAN-ODT) 4 MG TbDL DIS ONE T PO TID    polyethylene glycol (GLYCOLAX) 17 gram/dose powder MIX 1 CAPFUL IN WATER TK PO QD     No current facility-administered medications for this visit.      Review of patient's allergies indicates:   Allergen Reactions    Amoxicillin Hives    Vicodin [hydrocodone-acetaminophen] Hives    Pcn [penicillins]      X-Ray Results:   FINDINGS:  There is diffuse osteopenia.  There is no evidence of acute fracture, dislocation, or bone destruction.  No significant degenerative change.  No erosions or soft tissue calcifications.      Impression    "    Osteopenia.       Subjective  Brissa reports "She has been trying to move her finger but it hurts and is numb. "    Functional Pain Scale Rating 0-10: 3-4/10 at present took motrin 1.5 hours ago, 1-2 /10 at rest, 7/10 with use  Location: PIP joint and middle phalnx  Description: Throbbing and Shooting  Activities which increase pain: movement  Activities which decrease pain: heat and the splint    Brissa's goals for therapy are: make a fist      Objective    Observation: Skin intact, swelling reduced joint creases    Sensation: Median Nerve Diminished  Snelling Lorena Monofilament Test: Yes  Results: 3.81  Stereognosis: Intact    Special Tests:  n/t    Wound Assessment: n/a    Edema: Circumferential measurements: as follows:    Middle right  Middle left     Proximal Plalnx 5.5 cm 5.5 cm  cm  cm   PIP Joint 5.5 cm 5.7cm  cm  cm   Middle Phanlnx 4.8 cm 5.0 cm  cm  cm   DIP Joint 4.4 cm 4.6 cm  cm  cm   Distal Phalnx 4.2 cm 4.1 cm  cm  cm     Range of Motion: right and left Active  (Ext/Flex) Middle right  Middle  Left      MP 0/80 0/66 / /   PIP 0/102 0/56 / /   DIP 0/66 0/15 / /     Thumb Opposition: to all tips and 5th MCP head  Palmar Abduction: WFL  Radial Abduction: WFL    Wrist Ext/Flex: WFL/WFL  Wrist RD/UD: WFL/WFL  Supination/Pronation: WFL/WFL     Strength: (CARLOS ENRIQUE Dynamometer in lbs.) Average 3 trials, Position II  Right: 60, 60,60 avg 60#  Left: 20,25,20 avg 21.6#    Pinch Strength: (Pinch Gauge in psi's), Average 3 trials  Jeter Pinch R) 22, 21, 20 avg 21psi's   L) 16,16, 16 avg 16 psi's  3pt Pinch   R) 25,25,25 avg 25 psi's  L) 13,13,14 avg 13.3psi's    Cultural/Spiritual/Education Needs: none noted or reported   Barriers to Learning: none noted or reported    Functional Limitations: Patient presents with the following functional Limitations:   Self Care / ADL: modified tasks eliminated long finger from engagement    Work/Activities: not able to engage left long finger in any tasks,sticks finger " out, not able to lift or carry with left hand but uses her forearm for holding bags.    Leisure: television and gardening in good weather    Treatment included: OT evaluation and instruction in written HEP including (Hand Therapy/Finger Exercises) joint blocking PIP and DIP joints, Isolated PIP flex, tendon gliding    Repetitions 10 each   Frequency 3* per day    Education in edema massage and contrast bath provided as well.  Pt instructed to d/c use of splint except at night if she continues to hit her hand on her headboard.  Pt demonstrated good performance of exercise for HEP in clinic.     Assessment  Treatment Diagnosis/ Problem List LUE Decreased ROM, Decreased  strength, Decreased pinch strength, Decreased functional hand use, Increased pain and Edema    Goals:   Patient to be IND with HEP and modalities for pain/edema managment.  Increase AROM of left long [id number] degrees to improve grasp of light items.  Increase left  strength 15 lbs. to improve functional grasp heavy grocery and household items.   Increase left pinch 4 psi's to increase IND opening bottles and squeezing toothpaste.   Decrease edema a total of 4 mm to increase joint mobility/flexibility for hand use.   Decrease complaints of pain to  1 out of 10 to increase functional hand use for ADL/work/leisure activities.    Profile and History Assessment of Occupational Performance Level of Clinical Decision Making Complexity Score   Occupational Profile:   Brissa Dietz is a 43 y.o. female who lives with their family and is currently unemployedBrissa Dietz has difficulty with  grooming and dressing  driving/transportation management and housework/household chores  affecting his/her daily functional abilities. His/her main goal for therapy is be able to make a fist.     Comorbidities:   none noted    Medical and Therapy History Review:   Brief               Performance Deficits    Physical:  Joint Mobility  Edema   Strength  Pinch  Strength  Pain    Cognitive:  No Deficits    Psychosocial:    No Deficits     Clinical Decision Making:  low    Assessment Process:  Problem-Focused Assessments    Modification/Need for Assistance:  Not Necessary    Intervention Selection:  Several Treatment Options       low  Based on PMHX, co morbidities , data from assessments and functional level of assistance required with task and clinical presentation directly impacting function.         Plan  Brissa Dietz to be treated by Occupational Therapy 2 times per week  during the certification period from 1/14/19 to 3/11/19 to achieve the established goals.     Treatment to include: Paraffin, Fluidotherapy, Modalities for pain management, Therapeutic exercises/activities. and Strengthening, as well as any other treatments deemed necessary based on the patient's needs or progress.     I certify the need for these services furnished under this plan of treatment and while under my care.     ____________________________________                         __________________  Physician/Referring Practitioner                                               Date of Signature

## 2019-01-18 ENCOUNTER — HOSPITAL ENCOUNTER (OUTPATIENT)
Dept: RADIOLOGY | Facility: HOSPITAL | Age: 44
Discharge: HOME | End: 2019-01-18
Attending: FAMILY MEDICINE
Payer: MEDICAID

## 2019-01-18 DIAGNOSIS — Z09 FOLLOW-UP EXAM, 3-6 MONTHS SINCE PREVIOUS EXAM: ICD-10-CM

## 2019-01-18 PROCEDURE — 76642 US BREAST RIGHT LIMITED: ICD-10-PCS | Mod: 26,RT,, | Performed by: RADIOLOGY

## 2019-01-18 PROCEDURE — 77065 MAMMO DIGITAL DIAGNOSTIC RIGHT WITH TOMOSYNTHESIS_CAD: ICD-10-PCS | Mod: 26,,, | Performed by: RADIOLOGY

## 2019-01-18 PROCEDURE — 77065 DX MAMMO INCL CAD UNI: CPT | Mod: 26,,, | Performed by: RADIOLOGY

## 2019-01-18 PROCEDURE — 77061 MAMMO DIGITAL DIAGNOSTIC RIGHT WITH TOMOSYNTHESIS_CAD: ICD-10-PCS | Mod: 26,,, | Performed by: RADIOLOGY

## 2019-01-18 PROCEDURE — 76642 ULTRASOUND BREAST LIMITED: CPT | Mod: TC,RT

## 2019-01-18 PROCEDURE — 77065 DX MAMMO INCL CAD UNI: CPT | Mod: TC

## 2019-01-18 PROCEDURE — 77061 BREAST TOMOSYNTHESIS UNI: CPT | Mod: 26,,, | Performed by: RADIOLOGY

## 2019-01-18 PROCEDURE — 76642 ULTRASOUND BREAST LIMITED: CPT | Mod: 26,RT,, | Performed by: RADIOLOGY

## 2019-01-21 NOTE — PROGRESS NOTES
Occupational Therapy Daily Treatment Note     Date: 1/22/2019  Name: Brissa Dietz  Clinic Number: [id number]    Therapy Diagnosis:   Encounter Diagnoses   Name Primary?    Range of motion deficit     Finger pain, left     Decreased pinch strength     Decreased  strength of left hand      Physician: Cameron Lyamn MD    Physician Orders: Eval and treat  Medical Diagnosis: sprained middle finger  Surgical Procedure and Date: n/a  Evaluation Date: 1/14/19  Insurance Authorization Period Expiration: 3/1/19  Plan of Care Certification Period: 3/11/19  Date of Return to MD: not appointed    Visit # / Visits authorized: 2 / 12  Time In:  9:30 am  Time Out: 10:00 am  Total Billable Time: 30 minutes    Precautions:  Standard      Subjective     Pt reports: Her finger still hurts but she has been doing her exercises but not the contrast bath.   she was compliant with home exercise program given last session.   Response to previous treatment: no adverse affects noted  Functional change: none reported    Pain: 2-3/10  Location: left long PIP joint      Objective       Brissa received the following direct contact modalities after being cleared for contraindications for 8 minutes:  -Patient received fluidotherapy to left hand(s) for 8 minutes to increase blood flow, circulation, desensitization, sensory re-education and for pain management.    Brissa received therapeutic exercises for 22 minutes including:  -passive stretch of PIP and DIP joint of long finger  -isolated PIP joint flexion x 10 reps  -joint blocking of PIP and DIP x 10 reps each  -dowel intrinsic curl x 2 min  -ergo gripper with 1 green and yellow band x 3 min.  -red clip 3pt pinch x 1  Min yellow clip x 1 min        Home Exercises and Education Provided     Education provided:   - resistive ex for  and pinch at home  - Progress towards goals     Written Home Exercises Provided: yes.  Exercises were reviewed and Brissa was able to demonstrate them  prior to the end of the session.  Brissa demonstrated good  understanding of the HEP provided.   .   See EMR under Patient Instructions for exercises provided 1/22/2019.        Assessment     Pt would continue to benefit from skilled OT.  Pt with overall improved motion of left long finger achieving greater flexion.  Pt tolerated light resistive exercises well.  Pt compliant with HEP.     Brissa is progressing well towards her goals and there are no updates to goals at this time. Pt prognosis is Good.     Pt will continue to benefit from skilled outpatient occupational therapy to address the deficits listed in the problem list on initial evaluation provide pt/family education and to maximize pt's level of independence in the home and community environment.     Anticipated barriers to occupational therapy: none noted    Pt's spiritual, cultural and educational needs considered and pt agreeable to plan of care and goals.    Goals:  Goals to be met in 8 weeks:   Patient to be IND with HEP and modalities for pain/edema managment.  Increase AROM of left long [id number] degrees to improve grasp of light items.  Increase left  strength 15 lbs. to improve functional grasp heavy grocery and household items.   Increase left pinch 4 psi's to increase IND opening bottles and squeezing toothpaste.   Decrease edema a total of 4 mm to increase joint mobility/flexibility for hand use.   Decrease complaints of pain to  1 out of 10 to increase functional hand use for ADL/work/leisure activities.    Plan   Continue with plan of care  Updates/Grading for next session: light resistance as tolerated      JOCELYNE Kolb

## 2019-01-22 ENCOUNTER — CLINICAL SUPPORT (OUTPATIENT)
Dept: REHABILITATION | Facility: HOSPITAL | Age: 44
End: 2019-01-22
Attending: FAMILY MEDICINE
Payer: MEDICAID

## 2019-01-22 DIAGNOSIS — R29.898 DECREASED GRIP STRENGTH OF LEFT HAND: ICD-10-CM

## 2019-01-22 DIAGNOSIS — M25.60 RANGE OF MOTION DEFICIT: ICD-10-CM

## 2019-01-22 DIAGNOSIS — R29.898 DECREASED PINCH STRENGTH: ICD-10-CM

## 2019-01-22 DIAGNOSIS — M79.645 FINGER PAIN, LEFT: ICD-10-CM

## 2019-01-22 PROCEDURE — 97530 THERAPEUTIC ACTIVITIES: CPT | Mod: PN

## 2019-01-22 NOTE — PATIENT INSTRUCTIONS
Strengthening (Resistive Putty)        Squeeze putty using thumb and all fingers.  Repeat _20__ times. Do __1__ sessions per day.    Copyright © I. All rights reserved.   Three Jaw Ranjeet Pinch Strengthening (Resistive Putty)        Pull putty, using thumb, index and middle fingers.  Repeat __20__ times. Do __1__ sessions per day.    Copyright © [employer]. All rights reserved.

## 2019-01-29 ENCOUNTER — CLINICAL SUPPORT (OUTPATIENT)
Dept: REHABILITATION | Facility: HOSPITAL | Age: 44
End: 2019-01-29
Attending: FAMILY MEDICINE
Payer: MEDICAID

## 2019-01-29 DIAGNOSIS — R29.898 DECREASED PINCH STRENGTH: ICD-10-CM

## 2019-01-29 DIAGNOSIS — M79.645 FINGER PAIN, LEFT: ICD-10-CM

## 2019-01-29 DIAGNOSIS — R29.898 DECREASED GRIP STRENGTH OF LEFT HAND: ICD-10-CM

## 2019-01-29 DIAGNOSIS — M25.60 RANGE OF MOTION DEFICIT: ICD-10-CM

## 2019-01-29 PROCEDURE — 97530 THERAPEUTIC ACTIVITIES: CPT | Mod: PN

## 2019-01-29 NOTE — PROGRESS NOTES
Occupational Therapy Daily Treatment Note     Date: 1/29/2019  Name: Brissa Dietz  Clinic Number: [id number]    Therapy Diagnosis:   Encounter Diagnoses   Name Primary?    Range of motion deficit     Finger pain, left     Decreased pinch strength     Decreased  strength of left hand      Physician: Cameron Lyman MD    Physician Orders: Eval and treat  Medical Diagnosis: sprained middle finger  Surgical Procedure and Date: n/a  Evaluation Date: 1/14/19  Insurance Authorization Period Expiration: 3/1/19  Plan of Care Certification Period: 3/11/19  Date of Return to MD: not appointed    Visit # / Visits authorized: 3 / 12  Time In:  9:30  am  Time Out: 10:05 am  Total Billable Time: 35 minutes    Precautions:  Standard      Subjective     Pt reports:  The heat helps her hand.     She was compliant with home exercise program given last session with exception to contrast bath   Response to previous treatment: no adverse affects noted  Functional change: using her hand intermittently at home    Pain: 1-2/10  Location: left long PIP joint      Objective     Brissa received the following direct contact modalities after being cleared for contraindications for 8 minutes:  -Patient received fluidotherapy to left hand(s) for 8 minutes to increase blood flow, circulation, desensitization, sensory re-education and for pain management.    Brissa received therapeutic exercises for 27 minutes including:  -passive stretch of PIP and DIP joint of long finger  -dowel intrinsic curl x 2 min  -isolated PIP joint flexion x 10 reps  -fisting with yellow putty x 4 min  -red clip 3pt pinch x 2 min   -Ultra gripper setting #2 x 3 min    Home Exercises and Education Provided     Education provided:   - resistive ex for  and pinch at home  - Progress towards goals     Written Home Exercises Provided: yes.  Exercises were reviewed and Brissa was able to demonstrate them prior to the end of the session.  Brissa demonstrated  good  understanding of the HEP provided.   .   See EMR under Patient Instructions for exercises provided 1/22/2019.        Assessment     Pt would continue to benefit from skilled OT.  Soft end feel of PIP joint, no resistance with passive composite flexion.  Pt able to achieve composite active flexion.  Pt is cautious with hand use.  Pt intensity is diminishing.     Brissa is progressing well towards her goals and there are no updates to goals at this time. Pt prognosis is Good.     Pt will continue to benefit from skilled outpatient occupational therapy to address the deficits listed in the problem list on initial evaluation provide pt/family education and to maximize pt's level of independence in the home and community environment.     Anticipated barriers to occupational therapy: none noted    Pt's spiritual, cultural and educational needs considered and pt agreeable to plan of care and goals.    Goals:  Goals to be met in 8 weeks:   Patient to be IND with HEP and modalities for pain/edema managment.  Increase AROM of left long finger 60 degrees to improve grasp of light items.  Increase left  strength 15 lbs. to improve functional grasp heavy grocery and household items.   Increase left pinch 4 psi's to increase IND opening bottles and squeezing toothpaste.   Decrease edema a total of 4 mm to increase joint mobility/flexibility for hand use.   Decrease complaints of pain to  1 out of 10 to increase functional hand use for ADL/work/leisure activities.    Plan   Continue with plan of care  Updates/Grading for next session: light resistance as tolerated      JOCELYNE Kolb

## 2019-02-01 ENCOUNTER — TELEPHONE (OUTPATIENT)
Dept: FAMILY MEDICINE | Facility: HOSPITAL | Age: 44
End: 2019-02-01

## 2019-02-01 NOTE — TELEPHONE ENCOUNTER
Pt called about her mammogram results and she was told to call us back and make an apt.     2/1/2019 11:23 AM Curt Johnson M.D.

## 2019-02-05 ENCOUNTER — CLINICAL SUPPORT (OUTPATIENT)
Dept: REHABILITATION | Facility: HOSPITAL | Age: 44
End: 2019-02-05
Attending: FAMILY MEDICINE
Payer: MEDICAID

## 2019-02-05 DIAGNOSIS — M79.645 FINGER PAIN, LEFT: ICD-10-CM

## 2019-02-05 DIAGNOSIS — R29.898 DECREASED PINCH STRENGTH: ICD-10-CM

## 2019-02-05 DIAGNOSIS — M25.60 RANGE OF MOTION DEFICIT: ICD-10-CM

## 2019-02-05 DIAGNOSIS — R29.898 DECREASED GRIP STRENGTH OF LEFT HAND: ICD-10-CM

## 2019-02-05 PROCEDURE — 97530 THERAPEUTIC ACTIVITIES: CPT | Mod: PN

## 2019-02-05 NOTE — PROGRESS NOTES
Occupational Therapy Progress Note / Discharge Summary     Date: 2/5/2019  Name: Brissa Dietz  Clinic Number: [id number]    Therapy Diagnosis:   Encounter Diagnoses   Name Primary?    Range of motion deficit     Finger pain, left     Decreased pinch strength     Decreased  strength of left hand      Physician: Cameron Lyman MD    Physician Orders: Eval and treat  Medical Diagnosis: sprained middle finger  Surgical Procedure and Date: n/a  Evaluation Date: 1/14/19  Insurance Authorization Period Expiration: 3/1/19  Plan of Care Certification Period: 3/11/19  Date of Return to MD: not appointed    Visit # / Visits authorized: 4 / 12  Time In:  9:40 am  Time Out: 10:15 am  Total Billable Time: 35 minutes    Precautions:  Standard     Date of Last visit: 2/5/19  Total Visits Received: 4  Cancelled Visits: 0  No Show Visits: 0        Subjective     Pt reports:  I am doing better but when I hit that finger by accident it really hurts.     She was compliant with home exercise program given on 1/14/19  Response to previous treatment: no adverse affects noted  Functional change: using her hand intermittently at home    Pain: 0/10  Location: left long PIP joint      Objective     Brissa received the following direct contact modalities after being cleared for contraindications for 8 minutes:  -Patient received fluidotherapy to left hand(s) for 8 minutes to increase blood flow, circulation, desensitization, sensory re-education and for pain management.    Brissa received therapeutic exercises for 27 minutes including:    Edema: Circumferential measurements: as follows:     Middle right  Middle left       Proximal Plalnx 5.5 cm 5.5 cm  5.5cm  cm   PIP Joint 5.5 cm 5.7cm  5.6cm  cm   Middle Phanlnx 4.8 cm 5.0 cm  4.8 cm  cm   DIP Joint 4.4 cm 4.6 cm  4.4cm  cm   Distal Phalnx 4.2 cm 4.1 cm  4.0cm  cm     Range of Motion: right and left Active  (Ext/Flex) Middle right  Middle  Left    left 2/5/19     MP 0/80 0/66   0/90 /   PIP 0/102 0/56 0/102 /   DIP 0/66 0/15 0/71 /      Strength: (CARLOS ENRIQUE Dynamometer in lbs.) Average 3 trials, Position II  Right: 60, 60,60 avg 60#  Left: 20,25,20 avg 21.6#          2/5/19- 60,60,50 avg 56.6#    Pinch Strength: (Pinch Gauge in psi's), Average 3 trials  Jeter Pinch R) 22, 21, 20 avg 21psi's   L) 16,16, 16 avg 16 psi's                                                    2/5/19 L) 20,21,20 avg 20.3 psi's  3pt Pinch   R) 25,25,25 avg 25 psi's  L) 13,13,14 avg 13.3psi's                                                   2/5/19 L) 18,18,24 avg 20 psi's    -red clip 2pt pinch(long and thumb) x 3 min   -Ultra gripper setting #3 x 2.5 min setting #2 x 1.5 min    Home Exercises and Education Provided     Written Home Exercises Provided:  Additional resistive block for HEP  Exercises were reviewed and Brissa was able to demonstrate them prior to the end of the session.  Brissa demonstrated good  understanding of the HEP provided.   .   See EMR under Patient Instructions for exercises provided 1/22/2019.        Assessment     Pt is demonstrating improvements in her  strength, pinch strength, finger edema, and functional use of left hand / fingers with all activities at home.  Pt reports 0/10 pain with exception to hitting it (7/10) and some discomfort with carrying heavy items.     Brissa met her goals as noted below    Anticipated barriers to occupational therapy: none noted    Pt's spiritual, cultural and educational needs considered and pt agreeable to plan of care and goals.    Goals:  Goals to be met in 8 weeks:   Patient to be IND with HEP and modalities for pain/edema management - met 2/5/19  Increase AROM of left long finger 60 degrees to improve grasp of light items- met 2/5/19.  Increase left  strength 15 lbs. to improve functional grasp heavy grocery and household items- met 2/5/19  Increase left pinch 4 psi's to increase IND opening bottles and squeezing toothpaste- met 2/5/19    Decrease edema a total of 4 mm to increase joint mobility/flexibility for hand use- met 2/5/19.   Decrease complaints of pain to  1 out of 10 to increase functional hand use for ADL/work/leisure activities.- met 2/5/19    Discharge reason: Patient has met all of her goals    Plan   This patient is discharged from Occupational Therapy    JOCELYNE Kolb

## 2019-02-15 ENCOUNTER — OFFICE VISIT (OUTPATIENT)
Dept: FAMILY MEDICINE | Facility: HOSPITAL | Age: 44
End: 2019-02-15
Attending: FAMILY MEDICINE
Payer: MEDICAID

## 2019-02-15 VITALS
HEIGHT: 67 IN | DIASTOLIC BLOOD PRESSURE: 65 MMHG | WEIGHT: 185.63 LBS | SYSTOLIC BLOOD PRESSURE: 100 MMHG | HEART RATE: 73 BPM | BODY MASS INDEX: 29.13 KG/M2

## 2019-02-15 DIAGNOSIS — K58.0 IRRITABLE BOWEL SYNDROME WITH DIARRHEA: ICD-10-CM

## 2019-02-15 DIAGNOSIS — K08.9 TOOTH DISEASE: ICD-10-CM

## 2019-02-15 DIAGNOSIS — N63.0 BREAST MASS IN FEMALE: Primary | ICD-10-CM

## 2019-02-15 DIAGNOSIS — Z71.6 ENCOUNTER FOR SMOKING CESSATION COUNSELING: ICD-10-CM

## 2019-02-15 PROCEDURE — 99214 OFFICE O/P EST MOD 30 MIN: CPT | Performed by: FAMILY MEDICINE

## 2019-02-15 NOTE — PROGRESS NOTES
Subjective:       Patient ID: Brissa Dietz is a 43 y.o. female.    Chief Complaint: Mammogram result follow up    HPI   42 yo female presents to the clinic to go over her mammogram results. She had it done a month ago for a concerning area in her right breast. She has a family hx positive for breast cancer (aunt had it).   She also complaints of a toothache that started couple a days ago. She has been trying to find a dentist as she has decays and wants to be seen by a dentist. Denies fever, chills, swelling or drainage in the tooth area.     Review of Systems   Constitutional: Negative.    HENT: Positive for dental problem.    Respiratory: Negative.    Cardiovascular: Negative.    Gastrointestinal: Negative.    Genitourinary: Negative.    Musculoskeletal: Negative.    Neurological: Negative.        Objective:      Vitals:    02/15/19 0900   BP: 100/65   Pulse: 73     Physical Exam   Constitutional: She is oriented to person, place, and time. She appears well-developed and well-nourished. No distress.   HENT:   Head: Normocephalic and atraumatic.   Nose: Nose normal.   Mouth/Throat: No oropharyngeal exudate.   Eyes: Conjunctivae are normal. Right eye exhibits no discharge. Left eye exhibits no discharge.   Neck: Normal range of motion. Neck supple. No JVD present. No tracheal deviation present.   Cardiovascular: Normal rate, regular rhythm, normal heart sounds and intact distal pulses. Exam reveals no gallop and no friction rub.   No murmur heard.  Pulmonary/Chest: Effort normal and breath sounds normal. No stridor. No respiratory distress. She has no wheezes. She has no rales. She exhibits no tenderness.   Abdominal: Soft. Bowel sounds are normal. She exhibits no distension and no mass. There is no tenderness. There is no guarding.   Musculoskeletal: Normal range of motion. She exhibits no edema, tenderness or deformity.   Neurological: She is alert and oriented to person, place, and time.   Skin: Skin is warm  and dry. No rash noted. She is not diaphoretic. No erythema. No pallor.   Nursing note and vitals reviewed.      Assessment:       1. Breast mass in female    2. Tooth disease    3. Encounter for smoking cessation counseling    4. Irritable bowel syndrome with diarrhea        Plan:       Breast mass in female        - Mammogram and US results discussed with the pt and pt was told that she needs to have a repeat mammo done in 6 months. Pt voiced understanding.     Tooth disease  -     Ambulatory consult to Dentistry        Encounter for smoking cessation counseling        - Discussed smoking cessation. Pt understands but not fully ready.     Irritable bowel syndrome with diarrhea        - Stable. Continue current regimen.     Follow-up in about 6 months (around 8/15/2019).          2/15/2019 10:58 AM Curt Johnson M.D.

## 2019-04-18 ENCOUNTER — TELEPHONE (OUTPATIENT)
Dept: FAMILY MEDICINE | Facility: HOSPITAL | Age: 44
End: 2019-04-18

## 2019-04-18 NOTE — TELEPHONE ENCOUNTER
----- Message from Laura Cha MA sent at 4/16/2019 12:37 PM CDT -----  Patient would like a return call to number above to discuss status of letter stating she can no longer work.  Please call patient.  Thanks.

## 2019-04-22 ENCOUNTER — OFFICE VISIT (OUTPATIENT)
Dept: FAMILY MEDICINE | Facility: HOSPITAL | Age: 44
End: 2019-04-22
Attending: FAMILY MEDICINE
Payer: MEDICAID

## 2019-04-22 VITALS
HEIGHT: 67 IN | HEART RATE: 84 BPM | WEIGHT: 185.19 LBS | SYSTOLIC BLOOD PRESSURE: 115 MMHG | DIASTOLIC BLOOD PRESSURE: 75 MMHG | BODY MASS INDEX: 29.07 KG/M2 | TEMPERATURE: 97 F

## 2019-04-22 DIAGNOSIS — J30.1 SEASONAL ALLERGIC RHINITIS DUE TO POLLEN: Primary | ICD-10-CM

## 2019-04-22 DIAGNOSIS — S29.011A MUSCLE STRAIN OF ANTERIOR CHEST WALL: ICD-10-CM

## 2019-04-22 PROCEDURE — 99213 OFFICE O/P EST LOW 20 MIN: CPT | Performed by: FAMILY MEDICINE

## 2019-04-22 RX ORDER — CETIRIZINE HYDROCHLORIDE 10 MG/1
10 TABLET ORAL DAILY
Qty: 10 TABLET | Refills: 0 | Status: SHIPPED | OUTPATIENT
Start: 2019-04-22 | End: 2022-04-11

## 2019-04-22 RX ORDER — ACETAMINOPHEN AND CODEINE PHOSPHATE 300; 30 MG/1; MG/1
1 TABLET ORAL 3 TIMES DAILY PRN
Refills: 0 | COMMUNITY
Start: 2019-02-20 | End: 2019-04-22

## 2019-04-22 RX ORDER — FLUTICASONE PROPIONATE 50 MCG
1 SPRAY, SUSPENSION (ML) NASAL DAILY
Qty: 16 G | Refills: 0 | Status: SHIPPED | OUTPATIENT
Start: 2019-04-22 | End: 2019-09-13

## 2019-04-22 RX ORDER — DICYCLOMINE HYDROCHLORIDE 10 MG/1
10 CAPSULE ORAL 4 TIMES DAILY
COMMUNITY
End: 2019-09-13 | Stop reason: SDUPTHER

## 2019-04-22 RX ORDER — LIDOCAINE HCL 4 G/100G
80 CREAM TOPICAL 2 TIMES DAILY
Qty: 80 ML | Refills: 0 | Status: SHIPPED | OUTPATIENT
Start: 2019-04-22 | End: 2021-11-09

## 2019-04-22 RX ORDER — CLINDAMYCIN HYDROCHLORIDE 300 MG/1
CAPSULE ORAL
Refills: 0 | COMMUNITY
Start: 2019-02-20 | End: 2019-04-22

## 2019-04-22 NOTE — PROGRESS NOTES
Subjective:       Patient ID: Brissa Dietz is a 44 y.o. female.    Chief Complaint: Nasal Congestion    HPI   43 yo female presents with nasal congestion, cough, sneezing, teary eye that started Friday 3 days ago. She has runny nose and chills, but denies any fever, body aches, earache, no drainage. She was around her kids who were sick. She has tied Niquil that helps with the symptoms.   She has been coughing chronically since she smokes. She smokes 2-4 cigerrates a day. She was coughing hard that she feels like she pulled a muscle in her right breast.     Review of Systems   Constitutional: Negative.    HENT: Positive for congestion, rhinorrhea and sneezing. Negative for ear discharge, ear pain, facial swelling and sinus pressure.    Respiratory: Positive for cough. Negative for shortness of breath and wheezing.    Cardiovascular: Negative.    Gastrointestinal: Negative.    Genitourinary: Negative.    Musculoskeletal: Positive for myalgias.   Neurological: Negative.        Objective:      Vitals:    04/22/19 1049   BP: 115/75   Pulse: 84   Temp: 97.1 °F (36.2 °C)     Physical Exam   Constitutional: She is oriented to person, place, and time. She appears well-developed and well-nourished. No distress.   HENT:   Head: Normocephalic and atraumatic.   Nose: Nose normal.   Mouth/Throat: No oropharyngeal exudate.   Eyes: Conjunctivae are normal. Right eye exhibits no discharge. Left eye exhibits no discharge.   Neck: Normal range of motion. Neck supple. No JVD present. No tracheal deviation present.   Cardiovascular: Normal rate, regular rhythm, normal heart sounds and intact distal pulses. Exam reveals no gallop and no friction rub.   No murmur heard.  Pulmonary/Chest: Effort normal and breath sounds normal. No stridor. No respiratory distress. She has no wheezes. She has no rales. She exhibits no tenderness.   Abdominal: Soft. Bowel sounds are normal. She exhibits no distension and no mass. There is no tenderness.  There is no guarding.   Musculoskeletal: Normal range of motion. She exhibits no edema, tenderness or deformity.   Lymphadenopathy:     She has no cervical adenopathy.   Neurological: She is alert and oriented to person, place, and time.   Skin: Skin is warm and dry. No rash noted. She is not diaphoretic. No erythema. No pallor.   Nursing note and vitals reviewed.      Assessment:       1. Seasonal allergic rhinitis due to pollen    2. Muscle strain of anterior chest wall        Plan:       Seasonal allergic rhinitis due to pollen  -     cetirizine (ZYRTEC) 10 MG tablet; Take 1 tablet (10 mg total) by mouth once daily.  Dispense: 10 tablet; Refill: 0  -     fluticasone (FLONASE) 50 mcg/actuation nasal spray; 1 spray (50 mcg total) by Each Nare route once daily.  Dispense: 16 g; Refill: 0    Muscle strain of anterior chest wall  -     lidocaine HCl (ASPERCREME, LIDOCAINE,) 4 % Crea; Apply 80 mLs topically 2 (two) times daily.  Dispense: 80 mL; Refill: 0      Follow up in about 3 months (around 7/22/2019).      4/22/2019 11:39 AM Curt Johnson M.D.

## 2019-06-07 ENCOUNTER — HOSPITAL ENCOUNTER (EMERGENCY)
Facility: HOSPITAL | Age: 44
Discharge: HOME | End: 2019-06-07
Attending: EMERGENCY MEDICINE
Payer: MEDICAID

## 2019-06-07 VITALS
OXYGEN SATURATION: 98 % | HEIGHT: 67 IN | SYSTOLIC BLOOD PRESSURE: 128 MMHG | DIASTOLIC BLOOD PRESSURE: 60 MMHG | BODY MASS INDEX: 25.11 KG/M2 | WEIGHT: 160 LBS | TEMPERATURE: 98 F | HEART RATE: 71 BPM | RESPIRATION RATE: 18 BRPM

## 2019-06-07 DIAGNOSIS — M25.512 CHRONIC LEFT SHOULDER PAIN: Primary | ICD-10-CM

## 2019-06-07 DIAGNOSIS — G89.29 CHRONIC LEFT SHOULDER PAIN: Primary | ICD-10-CM

## 2019-06-07 PROCEDURE — 99284 EMERGENCY DEPT VISIT MOD MDM: CPT | Mod: ,,, | Performed by: PHYSICIAN ASSISTANT

## 2019-06-07 PROCEDURE — 99284 PR EMERGENCY DEPT VISIT,LEVEL IV: ICD-10-PCS | Mod: ,,, | Performed by: PHYSICIAN ASSISTANT

## 2019-06-07 PROCEDURE — 99284 EMERGENCY DEPT VISIT MOD MDM: CPT | Mod: 25

## 2019-06-07 PROCEDURE — 96372 THER/PROPH/DIAG INJ SC/IM: CPT

## 2019-06-07 PROCEDURE — 25000003 PHARM REV CODE 250: Performed by: PHYSICIAN ASSISTANT

## 2019-06-07 PROCEDURE — 63600175 PHARM REV CODE 636 W HCPCS: Performed by: PHYSICIAN ASSISTANT

## 2019-06-07 RX ORDER — KETOROLAC TROMETHAMINE 30 MG/ML
10 INJECTION, SOLUTION INTRAMUSCULAR; INTRAVENOUS
Status: COMPLETED | OUTPATIENT
Start: 2019-06-07 | End: 2019-06-07

## 2019-06-07 RX ORDER — LIDOCAINE 50 MG/G
1 PATCH TOPICAL DAILY
Qty: 15 PATCH | Refills: 0 | Status: SHIPPED | OUTPATIENT
Start: 2019-06-07 | End: 2019-09-13

## 2019-06-07 RX ORDER — LIDOCAINE 50 MG/G
1 PATCH TOPICAL ONCE
Status: DISCONTINUED | OUTPATIENT
Start: 2019-06-07 | End: 2019-06-07 | Stop reason: HOSPADM

## 2019-06-07 RX ORDER — ACETAMINOPHEN 500 MG
1000 TABLET ORAL
Status: COMPLETED | OUTPATIENT
Start: 2019-06-07 | End: 2019-06-07

## 2019-06-07 RX ADMIN — ACETAMINOPHEN 1000 MG: 500 TABLET ORAL at 11:06

## 2019-06-07 RX ADMIN — LIDOCAINE 1 PATCH: 50 PATCH TOPICAL at 11:06

## 2019-06-07 RX ADMIN — KETOROLAC TROMETHAMINE 10 MG: 30 INJECTION, SOLUTION INTRAMUSCULAR at 11:06

## 2019-06-07 NOTE — ED NOTES
LOC: The patient is awake, alert and aware of environment with an appropriate affect, the patient is oriented x 3 and speaking appropriately.  APPEARANCE: Patient resting comfortably and in no acute distress, patient is clean and well groomed, patient's clothing is properly fastened.  SKIN: The skin is warm and dry, color consistent with ethnicity, patient has normal skin turgor and moist mucus membranes, skin intact, no breakdown or bruising noted.  RESPIRATORY: Airway is open and patent, respirations are spontaneous, patient has a normal effort and rate, no accessory muscle use noted  ABDOMEN: Soft and non tender to palpation, no distention noted    Patient states she fell down stairs last month and hurt her right shoulder, patient was seen at [facility] and was told to rest the shoulder for 1 month that nothing was broken, patient states that she was at the store yesterday and leaned over and picked up a case of water and ever since then she has been in bad pain, patient rates pain 9/10 on pain scale, patient states the pain is in the right shoulder blade and goes down her right side that she believes is swollen, patient states her left shoulder is numb but not painful, right arm seems stiff does not have full range of motion, no acute distress noted, denies any other complains at this time, will continue to monitor

## 2019-06-07 NOTE — ED PROVIDER NOTES
Encounter Date: 6/7/2019       History     Chief Complaint   Patient presents with    Shoulder Pain     since fall last month; seen initially, pain worsened over the last month; no obvious deformity noted     11:24 AM  Patient is a 44-year-old female with a history of IBS, cervical radiculopathy presents the ED for right shoulder, neck, and upper back pain for 1 month.  Patient states that she initially fell on her right side and has since been having pain. She states her pain is worse when she uses her arm and rest for a prolonged period of time.  She has been taking Motrin (last dose yesterday) and gabapentin without resolution of her symptoms.  She endorses intermittent numbness across her back and her arm.  She denies any fever, chills, chest pain, cough, or shortness of breath.  Patient came into the emergency department due to persistent pain for one month.        Review of patient's allergies indicates:   Allergen Reactions    Amoxicillin Hives    Vicodin [hydrocodone-acetaminophen] Hives    Pcn [penicillins]      History reviewed. No pertinent past medical history.  History reviewed. No pertinent surgical history.  Family History   Problem Relation Age of Onset    Breast cancer Maternal Aunt      Social History     Tobacco Use    Smoking status: Current Every Day Smoker     Packs/day: 0.10     Types: Cigarettes    Smokeless tobacco: Never Used   Substance Use Topics    Alcohol use: No    Drug use: Yes     Types: Marijuana     Review of Systems   Constitutional: Negative for chills and fever.   HENT: Negative for sore throat.    Eyes: Negative for photophobia.   Respiratory: Negative for cough and shortness of breath.    Cardiovascular: Negative for chest pain.   Gastrointestinal: Negative for abdominal pain and nausea.   Endocrine: Negative for polyuria.   Genitourinary: Negative for dysuria and hematuria.   Musculoskeletal: Positive for arthralgias, back pain and neck pain.   Skin: Negative for  rash.   Neurological: Positive for numbness. Negative for facial asymmetry and weakness.   Hematological: Does not bruise/bleed easily.       Physical Exam     Initial Vitals [06/07/19 1049]   BP Pulse Resp Temp SpO2   128/60 71 18 98.2 °F (36.8 °C) 98 %      MAP       --         Physical Exam    Vitals reviewed.  Constitutional: She appears well-developed and well-nourished. She is not diaphoretic. No distress.   HENT:   Head: Normocephalic and atraumatic.   Nose: Nose normal.   Eyes: Conjunctivae and EOM are normal.   Neck: Normal range of motion.   Cardiovascular: Normal rate, regular rhythm and normal heart sounds. Exam reveals no friction rub.    No murmur heard.  Pulses:       Radial pulses are 2+ on the right side, and 2+ on the left side.   Pulmonary/Chest: Breath sounds normal. No respiratory distress. She has no wheezes. She has no rales.   Abdominal: Soft. Bowel sounds are normal. She exhibits no distension. There is no tenderness. There is no rebound.   Musculoskeletal: Normal range of motion.        Right shoulder: She exhibits tenderness, crepitus and pain. She exhibits normal range of motion, no bony tenderness, no swelling, no deformity, no laceration, no spasm and normal strength.        Cervical back: She exhibits no bony tenderness.        Back:    Generalized TTP to R upper back and shoulder without underlying bony tenderness.    Neurological: She is alert and oriented to person, place, and time. She has normal strength. No sensory deficit.   Skin: Skin is warm and dry. No erythema. No pallor.   Psychiatric: She has a normal mood and affect. Her behavior is normal. Judgment and thought content normal.         ED Course   Procedures  Labs Reviewed - No data to display       Imaging Results    None          Medical Decision Making:   History:   Old Medical Records: I decided to obtain old medical records.  Initial Assessment:   Patient is a 44-year-old female who presents the ED with a 1 month  history of right shoulder, upper back, and neck pain worse with movement and prolonged rest.  Differential Diagnosis:   Includes but is not limited to arthritis, cervical radiculopathy, capsulitis, tendinitis, muscle strain, rotator cuff injury.  Generalized TTP to R upper back and shoulder without underlying bony tenderness; I doubt fractures or dislocations. No risk factors for CAD and patient without chest pain; I doubt ACS.  ED Management:  Given history and physical exam, I do not suspect any emergent or life-threatening conditions at this time.  Patient's symptoms are most likely due to her cervical radiculopathy and arthritis given appreciable crepitus with ROM of right shoulder. I will give patient Toradol IM injection, oral Tylenol, and apply Lidoderm patch in the emergency department.  Patient states naproxen causes gastritis.  I asked that she continue Motrin/ibuprofen on a scheduled basis in addition to Tylenol.  I will provide her a prescription for Lidoderm patches.  She was instructed to follow up closely with Orthopedics for her chronic pain given history of 1 month.  All questions were answered.  Patient is comfortable with plan and stable for discharge.      I have reviewed patient's chart and discussed this case with my supervising MD.     Leesa Boone PA-C  Emergent Department  [facility]  Spectralink #[id number] or #[id number]                Attending Attestation:     Physician Attestation Statement for NP/PA:   I discussed this assessment and plan of this patient with the NP/PA, but I did not personally examine the patient. The face to face encounter was performed by the NP/PA.                     Clinical Impression:       ICD-10-CM ICD-9-CM   1. Chronic left shoulder pain M25.512 719.41    G89.29 338.29         Disposition:   Disposition: Discharged  Condition: Stable                      Leesa Boone PA-C  06/07/19 1148       Cale Cortes MD  06/10/19 [id number]

## 2019-06-07 NOTE — DISCHARGE INSTRUCTIONS
Continue take Motrin/ibuprofen on a scheduled basis.  Start taking acetaminophen/Tylenol on a scheduled basis for pain relief.  You can apply a Lidoderm patch to area for 12 hr each day.  Call and follow up closely with Orthopedics for further evaluation and management.    No future appointments.    Our goal in the emergency department is to always give you outstanding care and exceptional service. You may receive a survey by mail or e-mail in the next week regarding your experience in our ED. We would greatly appreciate your completing and returning the survey. Your feedback provides us with a way to recognize our staff who give very good care and it helps us learn how to improve when your experience was below our aspiration of excellence.

## 2019-06-11 ENCOUNTER — OFFICE VISIT (OUTPATIENT)
Dept: FAMILY MEDICINE | Facility: HOSPITAL | Age: 44
End: 2019-06-11
Attending: FAMILY MEDICINE
Payer: MEDICAID

## 2019-06-11 VITALS
HEIGHT: 67 IN | DIASTOLIC BLOOD PRESSURE: 76 MMHG | BODY MASS INDEX: 28.93 KG/M2 | WEIGHT: 184.31 LBS | SYSTOLIC BLOOD PRESSURE: 107 MMHG | HEART RATE: 92 BPM

## 2019-06-11 DIAGNOSIS — M75.101 TEAR OF RIGHT ROTATOR CUFF, UNSPECIFIED TEAR EXTENT: Primary | ICD-10-CM

## 2019-06-11 PROBLEM — K64.1 GRADE II INTERNAL HEMORRHOIDS: Status: ACTIVE | Noted: 2019-06-11

## 2019-06-11 PROBLEM — A63.0 CONDYLOMA ACUMINATUM OF ANUS: Status: ACTIVE | Noted: 2019-06-11

## 2019-06-11 PROCEDURE — 99213 OFFICE O/P EST LOW 20 MIN: CPT | Performed by: FAMILY MEDICINE

## 2019-06-11 RX ORDER — NAPROXEN 500 MG/1
500 TABLET ORAL 2 TIMES DAILY
Qty: 20 TABLET | Refills: 0 | Status: SHIPPED | OUTPATIENT
Start: 2019-06-11 | End: 2019-09-13

## 2019-06-11 RX ORDER — TRAMADOL HYDROCHLORIDE 50 MG/1
TABLET ORAL
Refills: 0 | COMMUNITY
Start: 2019-05-08 | End: 2019-09-13

## 2019-06-11 RX ORDER — LOPERAMIDE HYDROCHLORIDE 2 MG/1
2 CAPSULE ORAL 4 TIMES DAILY PRN
COMMUNITY
End: 2019-09-13 | Stop reason: SDUPTHER

## 2019-06-11 RX ORDER — FAMOTIDINE 20 MG/1
20 TABLET, FILM COATED ORAL 2 TIMES DAILY
Qty: 60 TABLET | Refills: 11 | Status: SHIPPED | OUTPATIENT
Start: 2019-06-11 | End: 2023-07-26

## 2019-06-11 RX ORDER — MELOXICAM 15 MG/1
TABLET ORAL
Refills: 0 | COMMUNITY
Start: 2019-05-08 | End: 2020-10-09 | Stop reason: ALTCHOICE

## 2019-06-11 NOTE — PROGRESS NOTES
Subjective:       Patient ID: Brissa Dietz is a 44 y.o. female.    Chief Complaint: Shoulder Pain (side pain and numbness)    HPI   44-year-old female presents with the complaint of right shoulder pain that started last month after a fall. She was seen in the ED last week and was given a Toradol shot with lidocaine patch and was told to follow up with us. She fell on her right side and it has been hurting since then. Pain is 8/10 with some numbness and tingling down her fingers. She also has limited ROM and cant raise her hand above 90 degree. She has been taking Motrin but not much of a help/   Denies F/C/N/V/CP/SOB.     Review of Systems    As per HPI.   Objective:      Vitals:    06/11/19 1509   BP: 107/76   Pulse: 92     Physical Exam   Constitutional: She is oriented to person, place, and time. She appears well-developed and well-nourished. No distress.   HENT:   Head: Normocephalic and atraumatic.   Nose: Nose normal.   Mouth/Throat: No oropharyngeal exudate.   Eyes: Conjunctivae are normal. Right eye exhibits no discharge. Left eye exhibits no discharge.   Neck: Normal range of motion. Neck supple. No JVD present. No tracheal deviation present.   Cardiovascular: Normal rate, regular rhythm, normal heart sounds and intact distal pulses. Exam reveals no gallop and no friction rub.   No murmur heard.  Pulmonary/Chest: Effort normal and breath sounds normal. No stridor. No respiratory distress. She has no wheezes. She has no rales. She exhibits no tenderness.   Abdominal: Soft. Bowel sounds are normal. She exhibits no distension and no mass. There is no tenderness. There is no guarding.   Musculoskeletal: She exhibits no edema or deformity.        Right shoulder: She exhibits decreased range of motion, tenderness, bony tenderness and decreased strength. She exhibits no swelling, no effusion, no crepitus, no laceration, no spasm and normal pulse.   Neurological: She is alert and oriented to person, place, and  time.   Skin: Skin is warm and dry. No rash noted. She is not diaphoretic. No erythema. No pallor.   Nursing note and vitals reviewed.      Assessment:       1. Tear of right rotator cuff, unspecified tear extent        Plan:       Tear of right rotator cuff, unspecified tear extent  -     naproxen (NAPROSYN) 500 MG tablet; Take 1 tablet (500 mg total) by mouth 2 (two) times daily.  Dispense: 20 tablet; Refill: 0  -     famotidine (PEPCID) 20 MG tablet; Take 1 tablet (20 mg total) by mouth 2 (two) times daily.  Dispense: 60 tablet; Refill: 11  -     Ambulatory Referral to Physical/Occupational Therapy      Follow up in about 2 weeks (around 6/25/2019).      6/11/2019 5:12 PM Curt Johnson M.D.

## 2019-06-26 ENCOUNTER — OFFICE VISIT (OUTPATIENT)
Dept: URGENT CARE | Facility: CLINIC | Age: 44
End: 2019-06-26
Payer: MEDICAID

## 2019-06-26 VITALS
HEART RATE: 79 BPM | TEMPERATURE: 98 F | SYSTOLIC BLOOD PRESSURE: 123 MMHG | BODY MASS INDEX: 24.28 KG/M2 | DIASTOLIC BLOOD PRESSURE: 78 MMHG | RESPIRATION RATE: 18 BRPM | WEIGHT: 155 LBS | OXYGEN SATURATION: 98 %

## 2019-06-26 DIAGNOSIS — L24.89 IRRITANT CONTACT DERMATITIS DUE TO OTHER AGENTS: ICD-10-CM

## 2019-06-26 DIAGNOSIS — B02.9 HERPES ZOSTER WITHOUT COMPLICATION: Primary | ICD-10-CM

## 2019-06-26 PROCEDURE — 99214 PR OFFICE/OUTPT VISIT, EST, LEVL IV, 30-39 MIN: ICD-10-PCS | Mod: S$GLB,,, | Performed by: PHYSICIAN ASSISTANT

## 2019-06-26 PROCEDURE — 99214 OFFICE O/P EST MOD 30 MIN: CPT | Mod: S$GLB,,, | Performed by: PHYSICIAN ASSISTANT

## 2019-06-26 RX ORDER — TRIAMCINOLONE ACETONIDE 1 MG/G
CREAM TOPICAL 2 TIMES DAILY
Qty: 45 G | Refills: 0 | Status: SHIPPED | OUTPATIENT
Start: 2019-06-26 | End: 2021-11-09

## 2019-06-26 RX ORDER — VALACYCLOVIR HYDROCHLORIDE 1 G/1
1000 TABLET, FILM COATED ORAL 3 TIMES DAILY
Qty: 21 TABLET | Refills: 0 | Status: SHIPPED | OUTPATIENT
Start: 2019-06-26 | End: 2019-09-13

## 2019-06-27 NOTE — PATIENT INSTRUCTIONS
Shingles Rash Discharge  If your condition worsens or fails to improve we recommend that you receive another evaluation at the ER immediately or contact your PCP to discuss your concerns or return here. You must understand that you've received an urgent care treatment only and that you may be released before all your medical problems are known or treated. You the patient will arrange for follouwp care as instructed.   Cool compressed to affected areas at least 2-3 times a day.  Avoid picking or manipulating the wound. Clean the wound twice a day and apply the topical cream as directed.   You should seek ER treatment if fever, increase pain, swelling, red streaks from affected area or other signs of increasing infection.   Please take your antiviral medication to the completion  Tylenol or ibuprofen can also be used as directed for pain unless you have an allergy to them or medical condition such as stomach ulcers, kidney or liver disease or blood thinners etc for which you should not be taking these type of medications.     Contact Dermatitis  Please return here or go to the Emergency Department for any concerns or worsening of condition.   Apply topical cream as prescribed  AVOID USING LIDOCAINE PATCHES IN THE FUTURE  Please take over the counter Benadryl as directed for the next 24-72 hours as needed for itching unless it makes you sleepy, then you can take over the counter Allegra or Claritin or Zyrtec as directed during the daytime hours as these generally do not cause drowsiness.       Shingles (Herpes Zoster)     Talk to your healthcare provider about the shingles vaccine.     Shingles is also called herpes zoster. It is a painful skin rash caused by the herpes zoster virus. This is the same virus that causes chickenpox. After a person has chickenpox, the virus remains inactive in the nerve cells. Years later, the virus can become active again and travel to the skin. Most people have shingles only once, but  it is possible to have it more than once.  What are the risk factors for shingles?  Anyone who has ever had chickenpox can develop shingles. But your risk is greater if you:  · Are 50 years of age or older  · Have an illness that weakens your immune system, such as HIV/AIDS  · Have cancer, especially Hodgkin disease or lymphoma  · Take medicines that weaken your immune system  What are the symptoms of shingles?  · The first sign of shingles is usually pain, burning, tingling, or itching on one part of your face or body. You may also feel as if you have the flu, with fever and chills.  · A red rash with small blisters appears within a few days. The rash may appear as follows:   ¨ The blisters can occur anywhere, but theyre most common on the back, chest, or abdomen.  ¨ They usually appear on only one side of the body, spreading along the nerve pathway where the virus was inactive.   ¨ The rash can also form around an eye, along one side of the face or neck, or in the mouth.  ¨ In a few people, usually those with weakened immune systems, shingles appear on more than one part of the body at once.  · After a few days, the blisters become dry and form a crust. The crust falls off in days to weeks. The blisters generally do not leave scars.  How is shingles treated?  For most people, shingles heals on its own in a few weeks. But treatment is recommended to help relieve pain, speed healing, and reduce the risk of complications. Antiviral medicines are prescribed within the first 72 hours of the appearance of the rash. To lessen symptoms:  · Apply ice packs (wrapped in a thin towel) or cool compresses, or soak in a cool bath.  · Use calamine lotion to calm itchy skin.  · Ask your healthcare provider about over-the-counter pain relievers. If your pain is severe, your healthcare provider may prescribe stronger pain medicines.  What are the complications of shingles?  Shingles often goes away with no lasting effects. But some  people have serious problems long after the blisters have healed:  · Postherpetic neuralgia. This is the most common complication. It is severe nerve pain at the place where the rash used to be. It can last for months, or even years after you have had shingles. Medicines can be prescribed to help relieve the pain and improve quality of life.  · Bacterial infection. Shingles blisters may become infected with bacteria. Antibiotic medicine is used to treat the infection.  · Eye problems. A person with shingles on the face should see his or her healthcare provider right away. Shingles can cause serious problems with vision, and even blindness.  Very rarely shingles can also lead to pneumonia, hearing problems, brain inflammation, or even death.   When to seek medical care  Contact your healthcare provider if you experience any of the following:  · Symptoms that dont go away with treatment  · A rash or blisters near your eye  · Increased drainage, fever, or rash after treatment, or severe pain that doesnt go away   How can shingles be prevented?  You can only get shingles if you have had chicken pox in the past. Those who have never had chickenpox can get the virus from you. Although instead of developing shingles, the person may get chickenpox. Until your blisters form scabs, avoid contact with others, especially the following:  · Pregnant women who have never had chickenpox or the vaccine  · Infants who were born early (prematurely) or who had low weight at birth  · People with weak immune system (for example, people receiving chemotherapy for cancer, people who have had organ transplants, or people with HIV infections)     The shingles vaccine  If youre 60 years of age or older, ask your healthcare provider if you should receive the shingles vaccine. The vaccine makes it less likely that you will develop shingles. If you do develop shingles, your symptoms will likely be milder than if you hadnt been vaccinated.  "Note: Although the vaccine is licensed for people 50 years of age or older, the CDC does not recommend the vaccine for those who are 50 to 59 years old.   Date Last Reviewed: 10/1/2016  © 0634-5547 [employer]. [address] [place] [address], [place] [address]. All rights reserved. This information is not intended as a substitute for professional medical care. Always follow your healthcare professional's instructions.        Contact Dermatitis  Contact dermatitis is a skin rash caused by something that touches the skin and makes it irritated and inflamed. Your skin may be red, swollen, dry, and may be cracked. Blisters may form and ooze. The rash will itch.  Contact dermatitis can form on the face and neck, backs of hands, forearms, genitals, and lower legs.  People can get contact dermatitis from lots of sources. These include:  · Plants such as poison ivy, oak, or sumac  · Chemicals in hair dyes and rinses, soaps, solvents, waxes, fingernail polish, and deodorants   · Jewelry or watchbands made of nickel  Contact dermatitis is not passed from person to person.  Talk with your healthcare provider about what may have caused the rash. A type of allergy testing called "patch testing" may be used to discover what you are allergic to. You will need to avoid the source of your rash in the future to prevent it from coming back.  Treatment is done to relieve itching and prevent the rash from coming back. The rash should go away in a few days to a few weeks.  Home care  Your healthcare provider may prescribe medicine to relieve swelling and itching. Follow all instructions when using these medicines.  General care:  · Avoid anything that heats up your skin, such as hot showers or baths, or direct sunlight. This can make itching worse.  · Apply cold compresses to soothe your sores to help relieve your symptoms. Do this for 30 minutes 3 to 4 times a day. You can make a cold compress by soaking a cloth in cold water. " Squeeze out excess water. You can add colloidal oatmeal to the water to help reduce itching. For severe itching in a small area, apply an ice pack wrapped in a thin towel. Do this for 20 minutes 3 to 4 times a day.  · You can also try wet dressings. One way to do this is to wear a wet piece of clothing under a dry one. Wear a damp shirt under a dry shirt if your upper body is affected. This can relieve itching and prevent you from scratching the affected area.  · You can also help relieve large areas of itching by taking a lukewarm bath with colloidal oatmeal added to the water.  · Use hydrocortisone cream for redness and irritation, unless another medicine was prescribed. You can also use benzocaine anesthetic cream or spray. Calamine lotion can also relieve mild symptoms.  · Use oral diphenhydramine to help reduce itching. You can buy this antihistamine at drug and grocery stores. It can make you sleepy, so use lower doses during the daytime. Or you can use loratadine. This is an antihistamine that will not make you sleepy. Do not use diphenhydramine if you have glaucoma or have trouble urinating due to an enlarged prostate.  · If a plant causes your rash, make sure to wash your skin and the clothes you were wearing when you came into contact with the plant. This is to wash away the plant oils that gave you the rash and prevent more or worse symptoms.  · Stay away from the substance or object that causes your symptoms. If you cant avoid it, wear gloves or some other type of protection.  Follow-up care  Follow up with your healthcare provider, or as advised.  When to seek medical advice  Call your healthcare provider right away if any of these occur:  · Spreading of the rash to other parts of your body  · Severe swelling of your face, eyelids, mouth, throat or tongue  · Trouble urinating due to swelling in the genital area  · Fever of 100.4°F (38°C) or higher  · Redness or swelling that gets worse  · Pain that  gets worse  · Foul-smelling fluid leaking from the skin  · Yellow-brown crusts on the open blisters  Date Last Reviewed: 9/1/2016  © 8161-9476 [employer]. [address] [place] [address], [place] [address]. All rights reserved. This information is not intended as a substitute for professional medical care. Always follow your healthcare professional's instructions.        Self-Care for Skin Rashes     Pat your skin dry. Do not rub.     When your skin reacts to a substance your body is sensitive to, it can cause a rash. You can treat most rashes at home by keeping the skin clean and dry. Many rashes may get better on their own within 2 to 3 days. You may need medical attention if your rash itches, drains, or hurts, particularly if the rash is getting worse.  What can cause a skin rash?  · Sun poisoning, caused by too much exposure to the sun  · An irritant or allergic reaction to a certain type of food, plant, or chemical, such as  shellfish, poison ivy, and or cleaning products  · An infection caused by a fungus (ringworm), virus (chickenpox), or bacteria (strep)  · Bites or infestation caused by insects or pests, such as ticks, lice, or mites  · Dry skin, which is often seen during the winter months and in older people  How can I control itching and skin damage?  · Take soothing lukewarm baths in a colloidal oatmeal product. You can buy this at the [employer].  · Do your best not to scratch. Clip fingernails short, especially in young children, to reduce skin damage if scratching does occur.  · Use moisturizing skin lotion instead of scratching your dry skin.  · Use sunscreen whenever going out into direct sun.  · Use only mild cleansing agents whenever possible.  · Wash with mild, nonirritating soap and warm water.  · Wear clothing that breathes, such as cotton shirts or canvas shoes.  · If fluid is seeping from the rash, cover it loosely with clean gauze to absorb the discharge.  · Many rashes are contagious.  Prevent the rash from spreading to others by washing your hands often before or after touching others with any skin rash.  Use medicine  · Antihistamines such as diphenhydramine can help control itching. But use with caution because they can make you drowsy.  · Using over-the-counter hydrocortisone cream on small rashes may help reduce swelling and itching  · Most over-the-counter antifungal medicines can treat athletes foot and many other fungal infections of the skin.  Check with your healthcare provider  Call your healthcare provider if:  · You were told that you have a fungal infection on your skin to make sure you have the correct type of medicine.  · You have questions or concerns about medicines or their side effects.     Call 911  Call 911 if either of these occur:  · Your tongue or lips start to swell  · You have difficulty breathing      Call your healthcare provider  Call your healthcare provider if any of these occur:  · Temperature of more than 101.0°F (38.3°C), or as directed  · Sore throat, a cough, or unusual fatigue  · Red, oozy, or painful rash gets worse. These are signs of infection.  · Rash covers your face, genitals, or most of your body  · Crusty sores or red rings that begin to spread  · You were exposed to someone who has a contagious rash, such as scabies or lice.  · Red bulls-eye rash with a white center (a sign of Lyme disease)  · You were told that you have resistant bacteria (MRSA) on your skin.   Date Last Reviewed: 5/12/2015  © 3212-0078 [employer]. [address] [place] [address], [place] [address]. All rights reserved. This information is not intended as a substitute for professional medical care. Always follow your healthcare professional's instructions.

## 2019-06-27 NOTE — PROGRESS NOTES
"Subjective:       Patient ID: Brissa Dietz is a 44 y.o. female.    Vitals:  weight is 70.3 kg (155 lb). Her temperature is 98.4 °F (36.9 °C). Her blood pressure is 123/78 and her pulse is 79. Her respiration is 18 and oxygen saturation is 98%.     Chief Complaint: Rash    Patient complains of rash to right torso that started with pain, "stinging and burning", and red crusting bumps three days ago. She reports she tried OTC lidocaine patch for symptoms, but after she applied the patch the rash became worse, more red, and itchy.     Rash   This is a new problem. The current episode started in the past 7 days. The affected locations include the torso. The rash is characterized by redness and burning. Pertinent negatives include no cough, fever or sore throat. Past treatments include anti-itch cream (burn cream). The treatment provided no relief.       Constitution: Negative for chills and fever.   HENT: Negative for facial swelling and sore throat.    Neck: Negative for painful lymph nodes.   Eyes: Negative for eye itching and eyelid swelling.   Respiratory: Negative for cough.    Musculoskeletal: Negative for joint pain and joint swelling.   Skin: Positive for rash and erythema. Negative for color change, pale, wound, abrasion, laceration, lesion, skin thickening/induration, puncture wound, bruising, abscess, avulsion and hives.   Allergic/Immunologic: Negative for environmental allergies, immunocompromised state and hives.   Hematologic/Lymphatic: Negative for swollen lymph nodes.       Objective:      Physical Exam   Constitutional: She is oriented to person, place, and time. She appears well-developed and well-nourished. No distress.   Patient is sitting on exam table. She appears uncomfortable, but does no appear to be in any acute distress. Nontoxic appearing.   HENT:   Head: Normocephalic and atraumatic. Head is without abrasion, without contusion and without laceration.   Right Ear: External ear normal. "   Left Ear: External ear normal.   Nose: Nose normal.   Mouth/Throat: Oropharynx is clear and moist.   Eyes: Pupils are equal, round, and reactive to light. Conjunctivae, EOM and lids are normal.   Neck: Trachea normal, full passive range of motion without pain and phonation normal. Neck supple.   Cardiovascular: Normal rate, regular rhythm and normal heart sounds.   Pulmonary/Chest: Effort normal and breath sounds normal. No stridor. No respiratory distress.   Musculoskeletal: Normal range of motion.   Neurological: She is alert and oriented to person, place, and time.   Skin: Skin is warm, dry and intact. Capillary refill takes less than 2 seconds. Rash noted. No abrasion, no bruising, no burn, no ecchymosis, no laceration and no lesion noted. Rash is maculopapular and vesicular. She is not diaphoretic. There is erythema.        Vesicular rash with yellow crusting; and square shaped erythematous region (see attached picture)   Psychiatric: She has a normal mood and affect. Her speech is normal and behavior is normal. Judgment and thought content normal. Cognition and memory are normal.   Nursing note and vitals reviewed.          Rash appears to be shingles with secondary contact dermatitis due to application of lidocaine patch. Will treat with antivirals and steroid cream.  Advised on return/follow-up precautions. Advised on ER precautions. Answered all patient questions. Patient verbalized understanding and voiced agreement with current treatment plan.    Assessment:       1. Herpes zoster without complication    2. Irritant contact dermatitis due to other agents        Plan:         Herpes zoster without complication  -     triamcinolone acetonide 0.1% (KENALOG) 0.1 % cream; Apply topically 2 (two) times daily.  Dispense: 45 g; Refill: 0  -     valACYclovir (VALTREX) 1000 MG tablet; Take 1 tablet (1,000 mg total) by mouth 3 (three) times daily. for 7 days  Dispense: 21 tablet; Refill: 0    Irritant contact  dermatitis due to other agents  -     triamcinolone acetonide 0.1% (KENALOG) 0.1 % cream; Apply topically 2 (two) times daily.  Dispense: 45 g; Refill: 0      Patient Instructions       Shingles Rash Discharge  If your condition worsens or fails to improve we recommend that you receive another evaluation at the ER immediately or contact your PCP to discuss your concerns or return here. You must understand that you've received an urgent care treatment only and that you may be released before all your medical problems are known or treated. You the patient will arrange for follouwp care as instructed.   Cool compressed to affected areas at least 2-3 times a day.  Avoid picking or manipulating the wound. Clean the wound twice a day and apply the topical cream as directed.   You should seek ER treatment if fever, increase pain, swelling, red streaks from affected area or other signs of increasing infection.   Please take your antiviral medication to the completion  Tylenol or ibuprofen can also be used as directed for pain unless you have an allergy to them or medical condition such as stomach ulcers, kidney or liver disease or blood thinners etc for which you should not be taking these type of medications.     Contact Dermatitis  Please return here or go to the Emergency Department for any concerns or worsening of condition.   Apply topical cream as prescribed  AVOID USING LIDOCAINE PATCHES IN THE FUTURE  Please take over the counter Benadryl as directed for the next 24-72 hours as needed for itching unless it makes you sleepy, then you can take over the counter Allegra or Claritin or Zyrtec as directed during the daytime hours as these generally do not cause drowsiness.       Shingles (Herpes Zoster)     Talk to your healthcare provider about the shingles vaccine.     Shingles is also called herpes zoster. It is a painful skin rash caused by the herpes zoster virus. This is the same virus that causes chickenpox. After  a person has chickenpox, the virus remains inactive in the nerve cells. Years later, the virus can become active again and travel to the skin. Most people have shingles only once, but it is possible to have it more than once.  What are the risk factors for shingles?  Anyone who has ever had chickenpox can develop shingles. But your risk is greater if you:  · Are 50 years of age or older  · Have an illness that weakens your immune system, such as HIV/AIDS  · Have cancer, especially Hodgkin disease or lymphoma  · Take medicines that weaken your immune system  What are the symptoms of shingles?  · The first sign of shingles is usually pain, burning, tingling, or itching on one part of your face or body. You may also feel as if you have the flu, with fever and chills.  · A red rash with small blisters appears within a few days. The rash may appear as follows:   ¨ The blisters can occur anywhere, but theyre most common on the back, chest, or abdomen.  ¨ They usually appear on only one side of the body, spreading along the nerve pathway where the virus was inactive.   ¨ The rash can also form around an eye, along one side of the face or neck, or in the mouth.  ¨ In a few people, usually those with weakened immune systems, shingles appear on more than one part of the body at once.  · After a few days, the blisters become dry and form a crust. The crust falls off in days to weeks. The blisters generally do not leave scars.  How is shingles treated?  For most people, shingles heals on its own in a few weeks. But treatment is recommended to help relieve pain, speed healing, and reduce the risk of complications. Antiviral medicines are prescribed within the first 72 hours of the appearance of the rash. To lessen symptoms:  · Apply ice packs (wrapped in a thin towel) or cool compresses, or soak in a cool bath.  · Use calamine lotion to calm itchy skin.  · Ask your healthcare provider about over-the-counter pain relievers. If  your pain is severe, your healthcare provider may prescribe stronger pain medicines.  What are the complications of shingles?  Shingles often goes away with no lasting effects. But some people have serious problems long after the blisters have healed:  · Postherpetic neuralgia. This is the most common complication. It is severe nerve pain at the place where the rash used to be. It can last for months, or even years after you have had shingles. Medicines can be prescribed to help relieve the pain and improve quality of life.  · Bacterial infection. Shingles blisters may become infected with bacteria. Antibiotic medicine is used to treat the infection.  · Eye problems. A person with shingles on the face should see his or her healthcare provider right away. Shingles can cause serious problems with vision, and even blindness.  Very rarely shingles can also lead to pneumonia, hearing problems, brain inflammation, or even death.   When to seek medical care  Contact your healthcare provider if you experience any of the following:  · Symptoms that dont go away with treatment  · A rash or blisters near your eye  · Increased drainage, fever, or rash after treatment, or severe pain that doesnt go away   How can shingles be prevented?  You can only get shingles if you have had chicken pox in the past. Those who have never had chickenpox can get the virus from you. Although instead of developing shingles, the person may get chickenpox. Until your blisters form scabs, avoid contact with others, especially the following:  · Pregnant women who have never had chickenpox or the vaccine  · Infants who were born early (prematurely) or who had low weight at birth  · People with weak immune system (for example, people receiving chemotherapy for cancer, people who have had organ transplants, or people with HIV infections)     The shingles vaccine  If youre 60 years of age or older, ask your healthcare provider if you should receive the  "shingles vaccine. The vaccine makes it less likely that you will develop shingles. If you do develop shingles, your symptoms will likely be milder than if you hadnt been vaccinated. Note: Although the vaccine is licensed for people 50 years of age or older, the CDC does not recommend the vaccine for those who are 50 to 59 years old.   Date Last Reviewed: 10/1/2016  © 3321-6364 [employer]. [address] [place] [address], [place] [address]. All rights reserved. This information is not intended as a substitute for professional medical care. Always follow your healthcare professional's instructions.        Contact Dermatitis  Contact dermatitis is a skin rash caused by something that touches the skin and makes it irritated and inflamed. Your skin may be red, swollen, dry, and may be cracked. Blisters may form and ooze. The rash will itch.  Contact dermatitis can form on the face and neck, backs of hands, forearms, genitals, and lower legs.  People can get contact dermatitis from lots of sources. These include:  · Plants such as poison ivy, oak, or sumac  · Chemicals in hair dyes and rinses, soaps, solvents, waxes, fingernail polish, and deodorants   · Jewelry or watchbands made of nickel  Contact dermatitis is not passed from person to person.  Talk with your healthcare provider about what may have caused the rash. A type of allergy testing called "patch testing" may be used to discover what you are allergic to. You will need to avoid the source of your rash in the future to prevent it from coming back.  Treatment is done to relieve itching and prevent the rash from coming back. The rash should go away in a few days to a few weeks.  Home care  Your healthcare provider may prescribe medicine to relieve swelling and itching. Follow all instructions when using these medicines.  General care:  · Avoid anything that heats up your skin, such as hot showers or baths, or direct sunlight. This can make itching " worse.  · Apply cold compresses to soothe your sores to help relieve your symptoms. Do this for 30 minutes 3 to 4 times a day. You can make a cold compress by soaking a cloth in cold water. Squeeze out excess water. You can add colloidal oatmeal to the water to help reduce itching. For severe itching in a small area, apply an ice pack wrapped in a thin towel. Do this for 20 minutes 3 to 4 times a day.  · You can also try wet dressings. One way to do this is to wear a wet piece of clothing under a dry one. Wear a damp shirt under a dry shirt if your upper body is affected. This can relieve itching and prevent you from scratching the affected area.  · You can also help relieve large areas of itching by taking a lukewarm bath with colloidal oatmeal added to the water.  · Use hydrocortisone cream for redness and irritation, unless another medicine was prescribed. You can also use benzocaine anesthetic cream or spray. Calamine lotion can also relieve mild symptoms.  · Use oral diphenhydramine to help reduce itching. You can buy this antihistamine at drug and grocery stores. It can make you sleepy, so use lower doses during the daytime. Or you can use loratadine. This is an antihistamine that will not make you sleepy. Do not use diphenhydramine if you have glaucoma or have trouble urinating due to an enlarged prostate.  · If a plant causes your rash, make sure to wash your skin and the clothes you were wearing when you came into contact with the plant. This is to wash away the plant oils that gave you the rash and prevent more or worse symptoms.  · Stay away from the substance or object that causes your symptoms. If you cant avoid it, wear gloves or some other type of protection.  Follow-up care  Follow up with your healthcare provider, or as advised.  When to seek medical advice  Call your healthcare provider right away if any of these occur:  · Spreading of the rash to other parts of your body  · Severe swelling of  your face, eyelids, mouth, throat or tongue  · Trouble urinating due to swelling in the genital area  · Fever of 100.4°F (38°C) or higher  · Redness or swelling that gets worse  · Pain that gets worse  · Foul-smelling fluid leaking from the skin  · Yellow-brown crusts on the open blisters  Date Last Reviewed: 9/1/2016  © 6049-1624 [employer]. [address] [place] [address], [place] [address]. All rights reserved. This information is not intended as a substitute for professional medical care. Always follow your healthcare professional's instructions.        Self-Care for Skin Rashes     Pat your skin dry. Do not rub.     When your skin reacts to a substance your body is sensitive to, it can cause a rash. You can treat most rashes at home by keeping the skin clean and dry. Many rashes may get better on their own within 2 to 3 days. You may need medical attention if your rash itches, drains, or hurts, particularly if the rash is getting worse.  What can cause a skin rash?  · Sun poisoning, caused by too much exposure to the sun  · An irritant or allergic reaction to a certain type of food, plant, or chemical, such as  shellfish, poison ivy, and or cleaning products  · An infection caused by a fungus (ringworm), virus (chickenpox), or bacteria (strep)  · Bites or infestation caused by insects or pests, such as ticks, lice, or mites  · Dry skin, which is often seen during the winter months and in older people  How can I control itching and skin damage?  · Take soothing lukewarm baths in a colloidal oatmeal product. You can buy this at the [employer].  · Do your best not to scratch. Clip fingernails short, especially in young children, to reduce skin damage if scratching does occur.  · Use moisturizing skin lotion instead of scratching your dry skin.  · Use sunscreen whenever going out into direct sun.  · Use only mild cleansing agents whenever possible.  · Wash with mild, nonirritating soap and warm water.  · Wear  clothing that breathes, such as cotton shirts or canvas shoes.  · If fluid is seeping from the rash, cover it loosely with clean gauze to absorb the discharge.  · Many rashes are contagious. Prevent the rash from spreading to others by washing your hands often before or after touching others with any skin rash.  Use medicine  · Antihistamines such as diphenhydramine can help control itching. But use with caution because they can make you drowsy.  · Using over-the-counter hydrocortisone cream on small rashes may help reduce swelling and itching  · Most over-the-counter antifungal medicines can treat athletes foot and many other fungal infections of the skin.  Check with your healthcare provider  Call your healthcare provider if:  · You were told that you have a fungal infection on your skin to make sure you have the correct type of medicine.  · You have questions or concerns about medicines or their side effects.     Call 911  Call 911 if either of these occur:  · Your tongue or lips start to swell  · You have difficulty breathing      Call your healthcare provider  Call your healthcare provider if any of these occur:  · Temperature of more than 101.0°F (38.3°C), or as directed  · Sore throat, a cough, or unusual fatigue  · Red, oozy, or painful rash gets worse. These are signs of infection.  · Rash covers your face, genitals, or most of your body  · Crusty sores or red rings that begin to spread  · You were exposed to someone who has a contagious rash, such as scabies or lice.  · Red bulls-eye rash with a white center (a sign of Lyme disease)  · You were told that you have resistant bacteria (MRSA) on your skin.   Date Last Reviewed: 5/12/2015  © 6452-9958 [employer]. [address] [place] [address], [place] [address]. All rights reserved. This information is not intended as a substitute for professional medical care. Always follow your healthcare professional's instructions.

## 2019-07-01 ENCOUNTER — OFFICE VISIT (OUTPATIENT)
Dept: FAMILY MEDICINE | Facility: HOSPITAL | Age: 44
End: 2019-07-01
Attending: FAMILY MEDICINE
Payer: MEDICAID

## 2019-07-01 VITALS
BODY MASS INDEX: 27.86 KG/M2 | DIASTOLIC BLOOD PRESSURE: 67 MMHG | WEIGHT: 177.5 LBS | HEART RATE: 75 BPM | SYSTOLIC BLOOD PRESSURE: 99 MMHG | HEIGHT: 67 IN

## 2019-07-01 DIAGNOSIS — L23.3 ALLERGIC CONTACT DERMATITIS DUE TO DRUGS IN CONTACT WITH SKIN: Primary | ICD-10-CM

## 2019-07-01 DIAGNOSIS — M54.12 CERVICAL RADICULOPATHY: ICD-10-CM

## 2019-07-01 DIAGNOSIS — Z00.00 HEALTHCARE MAINTENANCE: ICD-10-CM

## 2019-07-01 PROCEDURE — 99214 OFFICE O/P EST MOD 30 MIN: CPT | Performed by: FAMILY MEDICINE

## 2019-07-01 NOTE — PROGRESS NOTES
"Subjective:       Patient ID: Brissa Dietz is a 44 y.o. female.    Chief Complaint: Herpes Zoster and Rash (from patch )    HPI   Ms. Brissa Dietz is a 45 yo female who presents complaining of a rash on the right side of her torso. The patient reports that the rash appeared after she applied a lidocaine patch last week. The area began to burn, sting, feel numb and pruritic, so she removed the patch and showered. However, the rash remained and has been bothering her since. She went to urgent care last week, where they told her she had shingles and gave her kenalog cream and valtrex, however the patient has not taken the valtrex as she does not believe that she has shingles. She has been applying the kenalog cream TID with relief of symptoms and improvement in the appearance of the rash. The patient reports that last week, the rash did not have a blister-like appearance, but that the lesions had "heads". Today, the rash is maculopapular, erythematous, and slightly raised. It looks improved in comparison to the image taken at her urgent care visit.     The patient was last seen in the clinic on 6/11/19 for right rotator cuff tear. The patient reports she starts PT tomorrow, however she has been doing home shoulder exercises that she learned from past PT sessions. She denies any complaints with her shoulder at this time, and she is able to raise her right arm above 90 degrees.      The patient states she needs a mammogram scheduled for this month. She is to get mammogram q6 months for suspicious mass.    Review of Systems    Constitutional: Negative for chills and fever.   HENT: Negative for congestion, hearing loss and sore throat.    Eyes: Negative for visual disturbance.   Respiratory: Positive for cough (Patient states she has chronic cough). Negative for shortness of breath.    Cardiovascular: Positive for chest pain (Patient reports occasional sharp chest pain on right side).   Gastrointestinal: Negative for " abdominal pain, nausea and vomiting.   Genitourinary: Negative for dysuria.   Musculoskeletal: Negative for arthralgias, gait problem and myalgias.   Skin: Positive for rash (Rash on right mid torso).   Neurological: Negative for dizziness and headaches  Objective:      Vitals:    07/01/19 1315   BP: 99/67   Pulse: 75     Physical Exam    Constitutional: She is oriented to person, place, and time. She appears well-developed and well-nourished.   HENT:   Head: Normocephalic and atraumatic.   Eyes: Conjunctivae and EOM are normal.   Neck: Normal range of motion. No JVD present.   Cardiovascular: Normal rate, regular rhythm, normal heart sounds and intact distal pulses. Exam reveals no gallop and no friction rub.   No murmur heard.  Pulmonary/Chest: Effort normal and breath sounds normal. No respiratory distress.   Abdominal: She exhibits no distension. There is no guarding.   Musculoskeletal: Normal range of motion. She exhibits no edema or deformity.   Neurological: She is alert and oriented to person, place, and time.   Skin: Skin is warm and dry. Rash noted. Rash is maculopapular.        Psychiatric: She has a normal mood and affect. Her behavior is normal.   Assessment:       1. Allergic contact dermatitis due to drugs in contact with skin    2. Cervical radiculopathy    3. Healthcare maintenance        Plan:       Allergic contact dermatitis due to drugs in contact with skin  - Pt was advised to continue to put her Triamcinolone cream for the rash and avoid using the Lidocaine patch.     Cervical radiculopathy  - Stable. Continue current regimen.     Healthcare maintenance  -     Mammo Digital Diagnostic Bilateral; Future; Expected date: 07/01/2019      Follow up in about 3 months (around 10/1/2019).      7/1/2019 4:01 PM Curt Johnson M.D.

## 2019-07-01 NOTE — MEDICAL/APP STUDENT
"Subjective:       Patient ID: Brissa Dietz is a 44 y.o. female.    Chief Complaint: Rash (from lidocaine patch)    HPI Ms. Brissa Dietz is a 43 yo female who presents complaining of a rash on the right side of her torso. The patient reports that the rash appeared after she applied a lidocaine patch last week. The area began to burn, sting, feel numb and pruritic, so she removed the patch and showered. However, the rash remained and has been bothering her since. She went to urgent care last week, where they told her she had shingles and gave her kenalog cream and valtrex, however the patient has not taken the valtrex as she does not believe that she has shingles. She has been applying the kenalog cream TID with relief of symptoms and improvement in the appearance of the rash. The patient reports that last week, the rash did not have a blister-like appearance, but that the lesions had "heads". Today, the rash is maculopapular, erythematous, and slightly raised. It looks improved in comparison to the image taken at her urgent care visit.    The patient was last seen in the clinic on 6/11/19 for right rotator cuff tear. The patient reports she starts PT tomorrow, however she has been doing home shoulder exercises that she learned from past PT sessions. She denies any complaints with her shoulder at this time, and she is able to raise her right arm above 90 degrees.     The patient states she needs a mammogram scheduled for this month. She is to get mammogram q6 months for suspicious mass.    Review of Systems   Constitutional: Negative for chills and fever.   HENT: Negative for congestion, hearing loss and sore throat.    Eyes: Negative for visual disturbance.   Respiratory: Positive for cough (Patient states she has chronic cough). Negative for shortness of breath.    Cardiovascular: Positive for chest pain (Patient reports occasional sharp chest pain on right side).   Gastrointestinal: Negative for abdominal pain, " nausea and vomiting.   Genitourinary: Negative for dysuria.   Musculoskeletal: Negative for arthralgias, gait problem and myalgias.   Skin: Positive for rash (Rash on right mid torso).   Neurological: Negative for dizziness and headaches.       Objective:      Physical Exam   Constitutional: She is oriented to person, place, and time. She appears well-developed and well-nourished.   HENT:   Head: Normocephalic and atraumatic.   Eyes: Conjunctivae and EOM are normal.   Neck: Normal range of motion. No JVD present.   Cardiovascular: Normal rate, regular rhythm, normal heart sounds and intact distal pulses. Exam reveals no gallop and no friction rub.   No murmur heard.  Pulmonary/Chest: Effort normal and breath sounds normal. No respiratory distress.   Abdominal: She exhibits no distension. There is no guarding.   Musculoskeletal: Normal range of motion. She exhibits no edema or deformity.   Neurological: She is alert and oriented to person, place, and time.   Skin: Skin is warm and dry. Rash noted. Rash is maculopapular.        Psychiatric: She has a normal mood and affect. Her behavior is normal.       Assessment:       No diagnosis found.    Plan:

## 2019-07-02 ENCOUNTER — TELEPHONE (OUTPATIENT)
Dept: RADIOLOGY | Facility: HOSPITAL | Age: 44
End: 2019-07-02

## 2019-07-02 ENCOUNTER — CLINICAL SUPPORT (OUTPATIENT)
Dept: REHABILITATION | Facility: HOSPITAL | Age: 44
End: 2019-07-02
Attending: FAMILY MEDICINE
Payer: MEDICAID

## 2019-07-02 DIAGNOSIS — Z78.9 IMPAIRED MOTOR CONTROL: ICD-10-CM

## 2019-07-02 DIAGNOSIS — M25.611 DECREASED RANGE OF MOTION OF RIGHT SHOULDER: ICD-10-CM

## 2019-07-02 DIAGNOSIS — M25.511 ACUTE PAIN OF RIGHT SHOULDER: ICD-10-CM

## 2019-07-02 PROCEDURE — 97162 PT EVAL MOD COMPLEX 30 MIN: CPT | Mod: PN

## 2019-07-02 PROCEDURE — 97110 THERAPEUTIC EXERCISES: CPT | Mod: PN

## 2019-07-02 NOTE — PLAN OF CARE
[facility]  Physical Therapy Initial Evaluation    Name: Brissa Dietz  Clinic Number: [id number]    Therapy Diagnosis:   Encounter Diagnoses   Name Primary?    Acute pain of right shoulder     Decreased range of motion of right shoulder     Impaired motor control      Physician: Curt Johnson MD    Physician Orders: PT Eval and Treat   Medical Diagnosis from Referral: M75.101 (ICD-10-CM) - Tear of right rotator cuff, unspecified tear extent  Evaluation Date: 7/2/2019  Authorization Period Expiration: 7/31/19  Plan of Care Expiration: 10/2/19  Visit # / Visits authorized: 1/ 1 (pending auth)    Time In: 1508  Time Out: 1548  Total Billable Time: 40 minutes    Precautions: Standard    Subjective   Date of onset: May 4, 2019  History of current condition - Brissa reports:     Pt is a 43 y/o F who presents to clinic with R shoulder pain that intensified after a fall about 2 months ago. She is R hand dominant. She has hx of R rotator cuff injury and therapy. She was seen in the ED following fall and administered Toradol shot with lidocaine patch. Pt fell on R side. She reports numbness and tingling down her fingers. Had f/u appt with MD yesterday. She has less complaints with R shoulder today compared to when she fell. She can now lift her arm above 90*. She reports adverse reaction when applying the lidocaine patch. She had rash develop and has been bothering her since. At her urgent care visit, they stated she had shingles and gave her valtrex. She has not been taking the valtrex because she does not believe she has shingles. Rash has been improving, and they determined not shingles.   Pt reports sharp chest pain on R side. Instructed her to seek medical advice for chest pain.    Medical History:   No past medical history on file.    Surgical History:   Brissa Dietz  has no past surgical history on file.    Medications:   Brissa has a current medication list which includes the  following prescription(s): bupropion, cetirizine, dicyclomine, famotidine, fluticasone propionate, gabapentin, lidocaine, lidocaine hcl, loperamide, meclizine, meloxicam, naproxen, ondansetron, tramadol, triamcinolone acetonide 0.1%, and valacyclovir.    Allergies:   Review of patient's allergies indicates:   Allergen Reactions    Amoxicillin Hives    Vicodin [hydrocodone-acetaminophen] Hives    Pcn [penicillins]         Imaging, none    Prior Therapy: PT in 2017 for R shoulder  Social History: lives with their family  Occupation: stay at home mom  Prior Level of Function: no difficulty with overhead activities  Current Level of Function: difficulty and pain in shoulder with all overhead movements    Pain:  Current 8/10, worst 10/10, best 0/10   Location: right shoulder, chest, shoulder blade   Description: Tight  Aggravating Factors: Lifting and overhead activities  Easing Factors: ice and laying on shoulder    Pts goals: improve motion in shoulder    Objective     Active Range of Motion: Measured in degrees    Shoulder Left Right   Flexion 165 126 p!   Abduction 165 131 p!   ER at 0 T5 T2 p!   IR T7 T7 p!         Passive Range of Motion: Measured in degrees    Shoulder Left Right   Flexion 170 168   Abduction 167 165       Upper Extremity Strength  Elbow Left Right   Forearm pronation 5/5 5/5   Forearm supination 5/5 5/5   Biceps 5/5 5/5   Triceps 5/5 5/5       Shoulder Left Right   Shoulder flexion: 5/5 5/5 p!   Shoulder Abduction: 5/5 5/5 p!   Shoulder ER 5/5 5/5   Shoulder IR 5/5 5/5       Special Tests:     Shoulder Left Right   AC Joint Compression Test Negative Negative   Empty Can Test Negative Negative   Hawkin's [place] Negative Negative   Crossover impingement test Negative Positive     Scapular Control/Dyskinesis:    Normal / Subtle / Obvious  Comments    Left  normal none    Right  subtle With overhead movement       Palpation Elbow:  none    Palpation Shoulder:  Moderate tenderness to R proximal  "clavicle, R upper trap, R supraspinatus muscle belly, and near R pec major insertion                  TREATMENT   Treatment Time In: 1523  Treatment Time Out: 1548  Total Treatment time separate from Evaluation: 25 minutes    Brissa received therapeutic exercises to develop ROM, flexibility and posture for 25 minutes including:  Supine thoracic ext/pec stretch over pillow 2x1'  Posterior capsule stretch 2x20"  Scapular retractions x10 c/ 5" holds  Wall slides x10 c/ 3" holds    HEP instruction and education    Home Exercises and Patient Education Provided    Education provided:   - Role of PT, PT POC  -involved anatomy and pathology  -mechanics of shoulder and scapula    Written Home Exercises Provided: yes.  Exercises were reviewed and Brissa was able to demonstrate them prior to the end of the session.  Brissa demonstrated good  understanding of the education provided.     See EMR under Patient Instructions for exercises provided 7/2/2019.    Assessment   Brissa is a 44 y.o. female referred to outpatient Physical Therapy with a medical diagnosis of tear of right rotator cuff, unspecified tear extent. Primary impairments include AROM, PROM, joint mobility, strength, soft tissue restrictions, and pain which limits functional mobility. This pt is a good candidate for skilled PT tx and stands to benefit from a combination of manual therapy including joint mobilizations with trigger point/myofacscial release, therapeutic exercise to establish core/joint stability, neuromuscular re-education, and modalities Prn. The pt has been educated on their dx/POC and consents to further PT tx.    Pt prognosis is Good.   Pt will benefit from skilled outpatient Physical Therapy to address the deficits stated above and in the chart below, provide pt/family education, and to maximize pt's level of independence.     Plan of care discussed with patient: Yes  Pt's spiritual, cultural and educational needs considered and patient is " agreeable to the plan of care and goals as stated below:     Anticipated Barriers for therapy: none    Medical Necessity is demonstrated by the following  History  Co-morbidities and personal factors that may impact the plan of care Co-morbidities:   hx of shoulder pain    Personal Factors:   coping style     moderate   Examination  Body Structures and Functions, activity limitations and participation restrictions that may impact the plan of care Body Regions:   neck  upper extremities    Body Systems:    gross symmetry  ROM  gross coordinated movement  transitions  motor control    Participation Restrictions:   Overhead activities, driving    Activity limitations:   Learning and applying knowledge  no deficits    General Tasks and Commands  no deficits    Communication  no deficits    Mobility  lifting and carrying objects  driving (bike, car, motorcycle)    Self care  no deficits    Domestic Life  cooking  doing house work (cleaning house, washing dishes, laundry)    Interactions/Relationships  no deficits    Life Areas  no deficits    Community and Social Life  community life  recreation and leisure         moderate   Clinical Presentation evolving clinical presentation with changing clinical characteristics moderate   Decision Making/ Complexity Score: moderate     Goals:  Short Term Goals (4 Weeks):   1. Pt to score 60% on FOTO  2. Pt to improve pain-free shoulder flex to 140*  3. Pt to improve pain-free shoulder abd to 130*  4. Pt to report at worst pain <6/10.   5. Pt to report compliance with HEP 5x/week and demonstrate proper exercise technique to PT to show competence with self management of condition.       Long Term Goals (6 Weeks):   1. Pt to score 65% on FOTO  2. Pt to improve pain-free shoulder flex to 155*  3. Pt to improve pain-free shoulder abd to 145*  4. Pt to report at worst pain <3/10  5. Pt to report compliance with HEP 3-5x/week and demonstrate proper exercise technique to PT to show  independence with self management of condition.  6. Pt to perform daily overhead activities with <3/10 pain.   7. Pt to demonstrate improved scapulohumeral rhythm of R shoulder.       Plan   Plan of care Certification: 7/2/2019 to 10/2/19.    Outpatient Physical Therapy 2 times weekly for 6 weeks to include the following interventions: Manual Therapy, Moist Heat/ Ice, Neuromuscular Re-ed, Patient Education, Self Care, Therapeutic Activites and Therapeutic Exercise.     Art Ribeiro, PT, DPT  7/2/2019

## 2019-07-12 ENCOUNTER — HOSPITAL ENCOUNTER (OUTPATIENT)
Dept: RADIOLOGY | Facility: HOSPITAL | Age: 44
Discharge: HOME | End: 2019-07-12
Attending: STUDENT IN AN ORGANIZED HEALTH CARE EDUCATION/TRAINING PROGRAM
Payer: MEDICAID

## 2019-07-12 DIAGNOSIS — Z00.00 HEALTHCARE MAINTENANCE: ICD-10-CM

## 2019-07-12 DIAGNOSIS — Z09 FOLLOW-UP EXAM, 3-6 MONTHS SINCE PREVIOUS EXAM: ICD-10-CM

## 2019-07-12 PROCEDURE — 77062 BREAST TOMOSYNTHESIS BI: CPT | Mod: 26,,, | Performed by: RADIOLOGY

## 2019-07-12 PROCEDURE — 77066 DX MAMMO INCL CAD BI: CPT | Mod: 26,,, | Performed by: RADIOLOGY

## 2019-07-12 PROCEDURE — 77066 DX MAMMO INCL CAD BI: CPT | Mod: TC

## 2019-07-12 PROCEDURE — 77066 MAMMO DIGITAL DIAGNOSTIC BILAT WITH TOMOSYNTHESIS_CAD: ICD-10-PCS | Mod: 26,,, | Performed by: RADIOLOGY

## 2019-07-12 PROCEDURE — 77062 MAMMO DIGITAL DIAGNOSTIC BILAT WITH TOMOSYNTHESIS_CAD: ICD-10-PCS | Mod: 26,,, | Performed by: RADIOLOGY

## 2019-09-04 DIAGNOSIS — M54.12 CERVICAL RADICULOPATHY: ICD-10-CM

## 2019-09-04 RX ORDER — GABAPENTIN 400 MG/1
400 CAPSULE ORAL 3 TIMES DAILY
Qty: 90 CAPSULE | Refills: 11 | Status: SHIPPED | OUTPATIENT
Start: 2019-09-04 | End: 2020-09-18 | Stop reason: SDUPTHER

## 2019-09-04 NOTE — TELEPHONE ENCOUNTER
----- Message from Laura Cha MA sent at 9/4/2019 10:47 AM CDT -----  Patient has appointment o 9/13.  Requesting refill on gabapentin; please send to [facility] [address].

## 2019-09-13 ENCOUNTER — LAB VISIT (OUTPATIENT)
Dept: LAB | Facility: HOSPITAL | Age: 44
End: 2019-09-13
Attending: FAMILY MEDICINE
Payer: MEDICAID

## 2019-09-13 ENCOUNTER — OFFICE VISIT (OUTPATIENT)
Dept: FAMILY MEDICINE | Facility: HOSPITAL | Age: 44
End: 2019-09-13
Attending: FAMILY MEDICINE
Payer: MEDICAID

## 2019-09-13 VITALS
SYSTOLIC BLOOD PRESSURE: 117 MMHG | DIASTOLIC BLOOD PRESSURE: 80 MMHG | TEMPERATURE: 98 F | BODY MASS INDEX: 27.12 KG/M2 | HEART RATE: 103 BPM | WEIGHT: 172.81 LBS | HEIGHT: 67 IN

## 2019-09-13 DIAGNOSIS — K58.9 IRRITABLE BOWEL SYNDROME, UNSPECIFIED TYPE: ICD-10-CM

## 2019-09-13 DIAGNOSIS — M54.12 CERVICAL RADICULOPATHY: ICD-10-CM

## 2019-09-13 DIAGNOSIS — K58.9 IRRITABLE BOWEL SYNDROME, UNSPECIFIED TYPE: Primary | ICD-10-CM

## 2019-09-13 LAB
ALBUMIN SERPL BCP-MCNC: 4.8 G/DL (ref 3.5–5.2)
ALP SERPL-CCNC: 71 U/L (ref 55–135)
ALT SERPL W/O P-5'-P-CCNC: 10 U/L (ref 10–44)
ANION GAP SERPL CALC-SCNC: 11 MMOL/L (ref 8–16)
AST SERPL-CCNC: 20 U/L (ref 10–40)
BASOPHILS # BLD AUTO: 0.04 K/UL (ref 0–0.2)
BASOPHILS NFR BLD: 0.5 % (ref 0–1.9)
BILIRUB SERPL-MCNC: 1.4 MG/DL (ref 0.1–1)
BUN SERPL-MCNC: 10 MG/DL (ref 6–20)
CALCIUM SERPL-MCNC: 10 MG/DL (ref 8.7–10.5)
CHLORIDE SERPL-SCNC: 105 MMOL/L (ref 95–110)
CO2 SERPL-SCNC: 24 MMOL/L (ref 23–29)
CREAT SERPL-MCNC: 0.8 MG/DL (ref 0.5–1.4)
DIFFERENTIAL METHOD: NORMAL
EOSINOPHIL # BLD AUTO: 0.1 K/UL (ref 0–0.5)
EOSINOPHIL NFR BLD: 1.6 % (ref 0–8)
ERYTHROCYTE [DISTWIDTH] IN BLOOD BY AUTOMATED COUNT: 13.6 % (ref 11.5–14.5)
EST. GFR  (AFRICAN AMERICAN): >60 ML/MIN/1.73 M^2
EST. GFR  (NON AFRICAN AMERICAN): >60 ML/MIN/1.73 M^2
GLUCOSE SERPL-MCNC: 85 MG/DL (ref 70–110)
HCT VFR BLD AUTO: 43.7 % (ref 37–48.5)
HGB BLD-MCNC: 14.4 G/DL (ref 12–16)
LYMPHOCYTES # BLD AUTO: 2.7 K/UL (ref 1–4.8)
LYMPHOCYTES NFR BLD: 32.4 % (ref 18–48)
MCH RBC QN AUTO: 31 PG (ref 27–31)
MCHC RBC AUTO-ENTMCNC: 33 G/DL (ref 32–36)
MCV RBC AUTO: 94 FL (ref 82–98)
MONOCYTES # BLD AUTO: 0.5 K/UL (ref 0.3–1)
MONOCYTES NFR BLD: 6 % (ref 4–15)
NEUTROPHILS # BLD AUTO: 4.9 K/UL (ref 1.8–7.7)
NEUTROPHILS NFR BLD: 59.5 % (ref 38–73)
PLATELET # BLD AUTO: 325 K/UL (ref 150–350)
PMV BLD AUTO: 10.5 FL (ref 9.2–12.9)
POTASSIUM SERPL-SCNC: 3.4 MMOL/L (ref 3.5–5.1)
PROT SERPL-MCNC: 8.1 G/DL (ref 6–8.4)
RBC # BLD AUTO: 4.65 M/UL (ref 4–5.4)
SODIUM SERPL-SCNC: 140 MMOL/L (ref 136–145)
WBC # BLD AUTO: 8.18 K/UL (ref 3.9–12.7)

## 2019-09-13 PROCEDURE — 80053 COMPREHEN METABOLIC PANEL: CPT

## 2019-09-13 PROCEDURE — 85025 COMPLETE CBC W/AUTO DIFF WBC: CPT

## 2019-09-13 PROCEDURE — 36415 COLL VENOUS BLD VENIPUNCTURE: CPT

## 2019-09-13 PROCEDURE — 99214 OFFICE O/P EST MOD 30 MIN: CPT | Performed by: STUDENT IN AN ORGANIZED HEALTH CARE EDUCATION/TRAINING PROGRAM

## 2019-09-13 RX ORDER — DULOXETIN HYDROCHLORIDE 30 MG/1
CAPSULE, DELAYED RELEASE ORAL
Qty: 187 CAPSULE | Refills: 0 | Status: SHIPPED | OUTPATIENT
Start: 2019-09-13 | End: 2019-10-23 | Stop reason: SDUPTHER

## 2019-09-13 RX ORDER — LOPERAMIDE HYDROCHLORIDE 2 MG/1
2 CAPSULE ORAL 4 TIMES DAILY PRN
Qty: 30 CAPSULE | Refills: 2 | Status: SHIPPED | OUTPATIENT
Start: 2019-09-13 | End: 2020-03-04 | Stop reason: SDUPTHER

## 2019-09-13 RX ORDER — DICYCLOMINE HYDROCHLORIDE 10 MG/1
20 CAPSULE ORAL 3 TIMES DAILY
Qty: 90 CAPSULE | Refills: 2 | Status: SHIPPED | OUTPATIENT
Start: 2019-09-13 | End: 2020-03-04 | Stop reason: SDUPTHER

## 2019-09-13 NOTE — PROGRESS NOTES
"Subjective:       Patient ID: Brissa Dietz is a 44 y.o. female.    Chief Complaint: Irritable Bowel Syndrome    Ms. Brissa Dietz is a 44 year old woman presenting for follow up of her IBS and for pain in the bilateral sides. She was previously diagnosed with IBS by an unknown doctor for which she has been taking meclizine, ondansetron, dicyclomine, and loperamide, the last two of which she has been out of for some time. She is now endorsing 3-6 episodes of loose, non-bloody, yellow stool for the past 4 days which she would have taken loperamide for in the past. She has nausea every morning (s/p bilateral tubal ligation 12 years ago) and when she eats certain foods as well as this chronic diarrhea or loose stools that have been present for many years. When she is taking all her medications she feels that her IBS symptoms are moderately well controlled. She also endorses a new onset gagging every time she brushes her teeth and vomiting whenever she brushes her tongue.     She reports 2 weeks ago onset of bilateral side pain at about the level of the diaphragm. She describes the pain on the right side as similar to the pain she is used to experiencing with her cervical radiculopathy (I.e. A burning nerve pain). The pain on the left side she describes as if feeling like "there is a golfball in my lung making it hard for me to breath." These both have not been present in the past despite a strong history of cervical radiculopathy with pain down the entire back and arms with associated numbness. She does report that she falls fairly frequently due to the radiculopathy and possible numbness in her lungs.     Review of Systems   Constitutional: Positive for appetite change (decrease). Negative for chills, fatigue and fever.   HENT: Negative for rhinorrhea, sinus pain, sneezing and sore throat.    Eyes: Negative for pain, discharge, redness and itching.   Respiratory: Positive for cough, chest tightness and shortness of " breath. Negative for wheezing.    Cardiovascular: Positive for chest pain (most likely anxiety, remitted within 20 minutes; bilateral) and palpitations. Negative for leg swelling.   Gastrointestinal: Positive for abdominal pain, diarrhea, nausea and vomiting. Negative for blood in stool and constipation.   Genitourinary: Negative for dysuria, flank pain, hematuria and urgency.   Musculoskeletal: Positive for arthralgias, back pain, myalgias, neck pain and neck stiffness.   Skin: Negative for color change and wound.   Neurological: Positive for dizziness and light-headedness. Negative for weakness.   Psychiatric/Behavioral: Positive for sleep disturbance. Negative for behavioral problems, confusion and decreased concentration. The patient is nervous/anxious.        Objective:      Vitals:    09/13/19 1031   BP: 117/80   Pulse: 103   Temp: 97.9 °F (36.6 °C)     Physical Exam   Constitutional: She is oriented to person, place, and time. She appears well-developed and well-nourished. No distress.   HENT:   Head: Normocephalic and atraumatic.   Eyes: Pupils are equal, round, and reactive to light. Conjunctivae and EOM are normal.   Neck: Neck supple. No JVD present. Muscular tenderness (pain on palpation) present. Decreased range of motion: due to pain.   Cardiovascular: Normal rate, regular rhythm, normal heart sounds and intact distal pulses. Exam reveals no gallop and no friction rub.   No murmur heard.  Pulmonary/Chest: Effort normal and breath sounds normal. No respiratory distress. She has no wheezes.   Abdominal: Soft. Bowel sounds are normal. She exhibits no distension and no mass. There is tenderness (in epigastrum).   Musculoskeletal: Normal range of motion. She exhibits tenderness (proximal medial biceps on R over noticeable bruise).   Neurological: She is alert and oriented to person, place, and time.   Skin: Skin is warm and dry. Capillary refill takes less than 2 seconds. She is not diaphoretic.    Psychiatric: She has a normal mood and affect. Her behavior is normal.       Assessment:       1. Irritable bowel syndrome, unspecified type    2. Cervical radiculopathy        Plan:       Irritable bowel syndrome, unspecified type  -     Patient is presenting with possible worsening of previously diagnosed IBS (no evidence of workup in Epic as she was diagnosed previously in the past by an unknown doctor). She has not been taking her dicycolime and loperamide as she ran out. These were refilled for symptomatic relief as the patient was complaining about bothersome diarrhea or loose stools. Unsure what to do about the tooth brushing associated nausea. Will reassess at follow up in 2 months to figure out if any further workup is needed or if any other treatment can be offered for better long-term control of IBS.  -     CBC auto differential; Future; Expected date: 09/13/2019  -     Comprehensive metabolic panel; Future; Expected date: 09/13/2019  -     loperamide (IMODIUM) 2 mg capsule; Take 1 capsule (2 mg total) by mouth 4 (four) times daily as needed for Diarrhea.  Dispense: 30 capsule; Refill: 2  -     dicyclomine (BENTYL) 10 MG capsule; Take 2 capsules (20 mg total) by mouth 3 (three) times daily.  Dispense: 90 capsule; Refill: 2    Cervical radiculopathy        -     Patient continues to express pain and numbness arising from her cervical radiculopathy. Today she is also reporting new, possible neuropathic, pain on both sides of her chest at the diaphragmatic level. In the setting of the patient's other complaints and her history, it is likely that the patient may have fibromyalgia or some other pain condition rooted in a psychological condition. I decided to start her on duloxetine for the neuropathic pain as well as possible fibromyalgia.        -     DULoxetine (CYMBALTA) 30 MG capsule; Take 1 capsule (30 mg total) by mouth once daily for 7 days, THEN 2 capsules (60 mg total) once daily.  Dispense: 187  capsule; Refill: 0    Patient refused flu shot      Follow up in about 2 months (around 11/13/2019) for follow up of IBS and for pain/fibromyalgia.      Ralf Yin MD PGY-1  09/13/2019

## 2019-10-23 RX ORDER — DULOXETIN HYDROCHLORIDE 30 MG/1
CAPSULE, DELAYED RELEASE ORAL
Qty: 187 CAPSULE | Refills: 0 | Status: SHIPPED | OUTPATIENT
Start: 2019-10-23 | End: 2019-11-14

## 2019-11-14 ENCOUNTER — OFFICE VISIT (OUTPATIENT)
Dept: FAMILY MEDICINE | Facility: HOSPITAL | Age: 44
End: 2019-11-14
Attending: FAMILY MEDICINE
Payer: MEDICAID

## 2019-11-14 VITALS — WEIGHT: 176.56 LBS | BODY MASS INDEX: 27.71 KG/M2 | HEIGHT: 67 IN

## 2019-11-14 DIAGNOSIS — J42 CHRONIC BRONCHITIS, UNSPECIFIED CHRONIC BRONCHITIS TYPE: ICD-10-CM

## 2019-11-14 DIAGNOSIS — K58.9 IRRITABLE BOWEL SYNDROME, UNSPECIFIED TYPE: ICD-10-CM

## 2019-11-14 DIAGNOSIS — M54.12 CERVICAL RADICULOPATHY: Primary | ICD-10-CM

## 2019-11-14 PROCEDURE — 99214 OFFICE O/P EST MOD 30 MIN: CPT | Performed by: STUDENT IN AN ORGANIZED HEALTH CARE EDUCATION/TRAINING PROGRAM

## 2019-11-14 RX ORDER — DULOXETIN HYDROCHLORIDE 60 MG/1
60 CAPSULE, DELAYED RELEASE ORAL DAILY
Qty: 180 CAPSULE | Refills: 0 | Status: SHIPPED | OUTPATIENT
Start: 2019-11-14 | End: 2020-03-04 | Stop reason: SDUPTHER

## 2019-11-14 RX ORDER — TRAMADOL HYDROCHLORIDE 50 MG/1
50 TABLET ORAL
COMMUNITY
End: 2022-04-11

## 2019-11-14 RX ORDER — IBUPROFEN 200 MG
200 TABLET ORAL
COMMUNITY
End: 2020-01-28

## 2019-11-14 NOTE — PROGRESS NOTES
"Subjective:       Patient ID: Brissa Dietz is a 44 y.o. female.    Chief Complaint: Follow-up for radiculopathy    Brissa Dietz is a 44 year old woman with PMHx of IBS, cervical radiculopathy, anxiety presenting for follow up of IBS and diffuse body pain. Patient reports that her IBS is currently well controlled; last time she was seen it was very poorly controlled with copious amounts of diarrhea. Now she denies any diarrhea but today she is endorsing nausea and vomiting. She had one episode of NBNB emesis in the clinic. She does report a sick contact at home and she only began to feel nauseous this morning.    Patient continues to endorse diffuse body pain. This has been a chronic complaint of nondescript pain throughout her body. At previous visit she endorsed a feeling of having "golf balls" in her bilateral sides causing her pain. Now the pain is most localized to the left 6-7th rib mid-axillary. No longer feels like there is something inside the body but there is severe pain causing difficulty breathing. The pain radiates from this point up and down her spine and across her back. Patient was prescribed duloxetine at previous visit. Patient reported that this did not help significantly with the pain but it is well controlling her anxiety. Upon further questioning she endorsed a chronic cough for many months, productive of sputum of varying colors from white to green to red. Patient repeats "I was born with bronchitis and it never went away." Patient has no documented history of asthma or other respiratory problems.     Review of Systems   Constitutional: Negative for appetite change, chills, fatigue, fever and unexpected weight change.   HENT: Negative for rhinorrhea, sinus pressure, sinus pain, sneezing and sore throat.    Eyes: Negative for pain, discharge and itching.   Respiratory: Positive for cough, chest tightness and shortness of breath.    Cardiovascular: Positive for chest pain. Negative for " palpitations.   Gastrointestinal: Positive for abdominal pain, nausea and vomiting. Negative for abdominal distention, blood in stool, constipation and diarrhea.   Genitourinary: Negative for difficulty urinating, dysuria, flank pain, frequency, hematuria and urgency.   Musculoskeletal: Positive for back pain and neck pain. Negative for arthralgias, myalgias and neck stiffness.   Skin: Negative for color change and rash.   Neurological: Negative for dizziness, weakness, light-headedness, numbness and headaches.   Psychiatric/Behavioral: Negative for behavioral problems, confusion, decreased concentration and sleep disturbance. The patient is nervous/anxious.        Objective:      There were no vitals filed for this visit.  Physical Exam   Constitutional: She is oriented to person, place, and time. She appears well-developed and well-nourished. No distress.   Patient appearing ill   HENT:   Head: Normocephalic and atraumatic.   Right Ear: External ear normal.   Left Ear: External ear normal.   Nose: Nose normal.   Mouth/Throat: Oropharynx is clear and moist. No oropharyngeal exudate.   Eyes: Pupils are equal, round, and reactive to light. Conjunctivae and EOM are normal.   Neck: Normal range of motion. Neck supple. No JVD present.   Cardiovascular: Normal rate, regular rhythm, normal heart sounds and intact distal pulses.   No murmur heard.  Pulmonary/Chest: Effort normal and breath sounds normal. No respiratory distress. She has no wheezes. She exhibits tenderness (L mid axillary line).   Abdominal: Soft. Bowel sounds are normal. She exhibits no distension and no mass. There is tenderness (diffuse).   Musculoskeletal: Normal range of motion. She exhibits no edema.   Neurological: She is alert and oriented to person, place, and time.   Skin: Skin is warm and dry. Capillary refill takes less than 2 seconds. She is not diaphoretic.   Psychiatric: She has a normal mood and affect. Her behavior is normal.        Assessment:       1. Cervical radiculopathy    2. Irritable bowel syndrome, unspecified type    3. Chronic bronchitis, unspecified chronic bronchitis type        Plan:       Cervical radiculopathy  -     DULoxetine (CYMBALTA) 60 MG capsule; Take 1 capsule (60 mg total) by mouth once daily.  Dispense: 180 capsule; Refill: 0  -     Patient continues to report diffuse pain throughout the body, especially localized to the side of the chest. Patient previously put on duloxetine. It is unclear whether this has helped the pain but pain is now unilateral instead of bilateral and patient reports that the duloxetine has also drastically helped with her anxiety. Will refill duloxetine. Pain now migrated to unilateral L side. Pain persists without any obvious cause, especially with the pain's description changing with every visit. Most likely the pain is psychogenic in origin but patient does endorse a chronic cough for many months; need to investigate for possible pleural causes.     Irritable bowel syndrome, unspecified type        -     Patient stable as of now on current regiment. Will continue current regiment.     Chronic bronchitis, unspecified chronic bronchitis type  -     X-Ray Chest PA And Lateral; Future; Expected date: 11/14/2019  -     Complete PFT with bronchodilator; Future  -     PULSE OXIMETRY WITH REST - PULM; Future  -     Upon further questioning regarding the chest pain, patient endorsed a history of 6+ months of chronic cough productive of white to green to red sputum. She also does endorse significant shortness of breath. Patient has never been worked up for any lung abnormalities. Will send for CXR to investigate source of cough and pain and PFT's to investigate possible asthma vs. Chronic bronchitis.       Follow up in about 3 months (around 2/14/2020).      Ralf Yin MD PGY-1  \[facility]\"" Family Medicine   11/14/2019 12:59 PM

## 2019-11-19 ENCOUNTER — PATIENT MESSAGE (OUTPATIENT)
Dept: FAMILY MEDICINE | Facility: HOSPITAL | Age: 44
End: 2019-11-19

## 2019-12-16 NOTE — PROGRESS NOTES
I have seen the pt, reviewed the notes, assessments, and/or procedures performed, I concur with her/his documentation of Brissa Dietz.

## 2020-01-28 ENCOUNTER — HOSPITAL ENCOUNTER (EMERGENCY)
Facility: HOSPITAL | Age: 45
Discharge: HOME | End: 2020-01-28
Attending: EMERGENCY MEDICINE
Payer: MEDICAID

## 2020-01-28 VITALS
RESPIRATION RATE: 16 BRPM | TEMPERATURE: 98 F | OXYGEN SATURATION: 98 % | BODY MASS INDEX: 27 KG/M2 | HEART RATE: 88 BPM | WEIGHT: 172 LBS | HEIGHT: 67 IN | SYSTOLIC BLOOD PRESSURE: 116 MMHG | DIASTOLIC BLOOD PRESSURE: 74 MMHG
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
## 2020-01-28 DIAGNOSIS — M54.50 ACUTE MIDLINE LOW BACK PAIN WITHOUT SCIATICA: ICD-10-CM

## 2020-01-28 LAB
B-HCG UR QL: NEGATIVE
CTP QC/QA: YES

## 2020-01-28 PROCEDURE — 81025 URINE PREGNANCY TEST: CPT | Performed by: PHYSICIAN ASSISTANT

## 2020-01-28 PROCEDURE — 99284 EMERGENCY DEPT VISIT MOD MDM: CPT | Mod: ,,, | Performed by: PHYSICIAN ASSISTANT

## 2020-01-28 PROCEDURE — 99283 EMERGENCY DEPT VISIT LOW MDM: CPT | Mod: 25

## 2020-01-28 PROCEDURE — 25000003 PHARM REV CODE 250: Performed by: PHYSICIAN ASSISTANT

## 2020-01-28 PROCEDURE — 99284 PR EMERGENCY DEPT VISIT,LEVEL IV: ICD-10-PCS | Mod: ,,, | Performed by: PHYSICIAN ASSISTANT

## 2020-01-28 RX ORDER — IBUPROFEN 600 MG/1
600 TABLET ORAL 3 TIMES DAILY
Qty: 15 TABLET | Refills: 0 | Status: SHIPPED | OUTPATIENT
Start: 2020-01-28 | End: 2020-02-02

## 2020-01-28 RX ORDER — ACETAMINOPHEN 500 MG
1000 TABLET ORAL
Status: COMPLETED | OUTPATIENT
Start: 2020-01-28 | End: 2020-01-28

## 2020-01-28 RX ORDER — IBUPROFEN 600 MG/1
600 TABLET ORAL
Status: COMPLETED | OUTPATIENT
Start: 2020-01-28 | End: 2020-01-28

## 2020-01-28 RX ADMIN — ACETAMINOPHEN 1000 MG: 500 TABLET ORAL at 03:01

## 2020-01-28 RX ADMIN — IBUPROFEN 600 MG: 600 TABLET, FILM COATED ORAL at 05:01

## 2020-01-28 NOTE — ED TRIAGE NOTES
"Brissa Dietz, a 44 y.o. female presents to the ED w/ complaint of fall off step in back yard. Step was wet, patient slipped and hit back. Patient denies hitting head or LOC.     The pain is lower back pain, " I can also feel the pain in my ribs", the pain is described as a sharp pain    Patient took Gabapentin with no relief.     Patient denies loss of bowel/ bladder control     Patient denies chest pain   Triage note:  Chief Complaint   Patient presents with    Fall     slipped and fell at 830, my back hurts, denies bowel/bladder incontinence     Review of patient's allergies indicates:   Allergen Reactions    Amoxicillin Hives and Itching    Hydrocodone-acetaminophen Hives and Nausea And Vomiting    Oxycodone-acetaminophen Nausea And Vomiting    Pcn [penicillins]      No past medical history on file.    "

## 2020-01-28 NOTE — ED PROVIDER NOTES
Encounter Date: 1/28/2020       History     Chief Complaint   Patient presents with    Fall     slipped and fell at 830, my back hurts, denies bowel/bladder incontinence     44 year old female with medical history of cervical radiculopathy, CVS, pancreatitis, gastric ulcers presenting to the ED with the chief complaint of lower back injury. Patient slipped on the 2nd to last step of a set of stairs this morning at 8:30am. She reports falling backwards and hit her lower back against the stairs. No LOC or head trauma. She was able to stand up and ambulate after the fall. She denies numbness, paresthesias, unilateral extremity weakness, bowel or bladder incontinence. No history of back surgeries or IVDU. She has only taken Gabapentin for her pain today. She reports history of developing hives with lidocaine patches and cannot take NSAIDs due to gastric ulcers.         Review of patient's allergies indicates:   Allergen Reactions    Amoxicillin Hives and Itching    Hydrocodone-acetaminophen Hives and Nausea And Vomiting    Oxycodone-acetaminophen Nausea And Vomiting    Pcn [penicillins]      History reviewed. No pertinent past medical history.  No past surgical history on file.  Family History   Problem Relation Age of Onset    Breast cancer Maternal Aunt      Social History     Tobacco Use    Smoking status: Current Every Day Smoker     Packs/day: 0.50     Types: Cigarettes    Smokeless tobacco: Never Used   Substance Use Topics    Alcohol use: No     Frequency: Never     Drinks per session: Patient refused     Binge frequency: Never    Drug use: Yes     Types: Marijuana     Review of Systems   Constitutional: Negative for chills, diaphoresis and fever.   HENT: Negative for congestion, sore throat and trouble swallowing.    Eyes: Negative for pain and redness.   Respiratory: Negative for cough and shortness of breath.    Cardiovascular: Negative for chest pain.   Gastrointestinal: Negative for abdominal pain,  constipation, diarrhea, nausea and vomiting.   Genitourinary: Negative for dysuria and hematuria.   Musculoskeletal: Positive for back pain. Negative for gait problem, neck pain and neck stiffness.   Skin: Negative for rash and wound.   Neurological: Negative for weakness, light-headedness, numbness and headaches.   Hematological: Does not bruise/bleed easily.       Physical Exam     Initial Vitals [01/28/20 1507]   BP Pulse Resp Temp SpO2   (!) 124/58 95 16 97.5 °F (36.4 °C) 97 %      MAP       --         Physical Exam    Constitutional: She appears well-developed and well-nourished. She is not diaphoretic. No distress.   HENT:   Head: Normocephalic and atraumatic.   Mouth/Throat: Oropharynx is clear and moist. No oropharyngeal exudate.   Eyes: EOM are normal. Pupils are equal, round, and reactive to light.   Neck: Normal range of motion. Neck supple.   Cardiovascular: Normal rate and regular rhythm.   Pulmonary/Chest: Breath sounds normal. No respiratory distress. She has no wheezes.   Abdominal: Soft. There is no tenderness.   Musculoskeletal: Normal range of motion.   Point tenderness to overlying proximal L-spine. No erythema, edema, or overlying skin changes.    Neurological: She is alert and oriented to person, place, and time. She has normal strength. No cranial nerve deficit or sensory deficit.   5/5 strength BLE hip flex/ext, knee flex,ext, plantarflexion and dorsiflexion. No saddle anesthesia, sensation symmetry BLE. Ambulates without difficulty. Negative SLR bilaterally.   Skin: Skin is warm and dry.         ED Course   Procedures  Labs Reviewed - No data to display       Imaging Results    None          Medical Decision Making:   History:   Old Medical Records: I decided to obtain old medical records.  Old Records Summarized: records from clinic visits.       <> Summary of Records: MRI L-spine 3/2017 available through care everywhere - impression:   -No disc herniation, extrusion, or amy spinal  stenosis is evident.  There are moderate internal nuclear degenerative disc changes at  L2-L4. Note that this patient appears to have uterine prolapse, which  requires clinical correlation.  Clinical Tests:   Radiological Study: Ordered and Reviewed       APC / Resident Notes:   44 year old female with medical history of cervical radiculopathy, CVS, pancreatitis, gastric ulcers presenting to the ED c/o lower back injury after falling backwards on a set of stairs this morning. DDx includes but not limited to contusion, muscular strain, disc herniation, vertebral fracture. I have considered but do not suspect nephrolithiasis, epidural abscess, cauda equina, malignancy, AAA. Will check X-ray L-spine.     X-ray negative for vertebral fracture. Adequate vertebral body heights and alignment. Patient is neurovascularly intact on exam and ambulates without difficulty in the ED. Do not suspect emergent processes at this time. Discussed reported history of gastric ulcers with the patient. She denies history of upper GI scope or GI bleeding. She is unclear on when she was diagnosed with this. Given the above, I feel a short course of NSAIDs appropriate at this time. RX for IBU provided to be taken with Tylenol and heating pads for further management of her pain. Patient expresses understanding and agreeable to the plan. Return to ED precautions given for new, worsening, or concerning symptoms.                             Clinical Impression:       ICD-10-CM ICD-9-CM   1. Fall, initial encounter W19.XXXA E888.9   2. Acute midline low back pain without sciatica M54.5 724.2         Disposition:   Disposition: Discharged  Condition: Stable                     Robbie Serrano PA-C  01/28/20 6915

## 2020-01-28 NOTE — ED NOTES
Patient identifiers verified and correct for Brissa Dietz.    LOC: The patient is awake, alert and aware of environment with an appropriate affect, the patient is oriented x 4 and speaking appropriately.    APPEARANCE: Patient in visible pain     SKIN: The skin is warm and dry, color consistent with ethnicity, patient has normal skin turgor and moist mucus membranes, skin intact, no breakdown or bruising noted.    MUSCULOSKELETAL: Patient moving all extremities spontaneously, no obvious swelling or deformities noted.  RESPIRATORY: Airway is open and patent, respirations are spontaneous, patient has a normal effort and rate, no accessory muscle use noted  CARDIAC: Patient has a normal rate and regular rhythm, no periphreal edema noted, capillary refill < 3 seconds.    ABDOMEN: Soft and non tender to palpation, no distention noted, normoactive bowel sounds present in all four quadrants.    NEUROLOGIC: PERRLA, 3 mm bilaterally, eyes open spontaneously, behavior appropriate to situation, follows commands, facial expression symmetrical, bilateral hand grasp equal and even, purposeful motor response noted, normal sensation in all extremities when touched with a finger.

## 2020-01-28 NOTE — DISCHARGE INSTRUCTIONS
Take the prescribed Ibuprofen as directed for your pain. Take the medicine with meals to avoid an upset stomach. Return to the emergency room for new, worsening, or concerning symptoms.

## 2020-02-07 ENCOUNTER — HOSPITAL ENCOUNTER (OUTPATIENT)
Dept: RADIOLOGY | Facility: HOSPITAL | Age: 45
Discharge: HOME | End: 2020-02-07
Attending: STUDENT IN AN ORGANIZED HEALTH CARE EDUCATION/TRAINING PROGRAM
Payer: MEDICAID

## 2020-02-07 DIAGNOSIS — J42 CHRONIC BRONCHITIS, UNSPECIFIED CHRONIC BRONCHITIS TYPE: ICD-10-CM

## 2020-02-07 PROCEDURE — 71046 XR CHEST PA AND LATERAL: ICD-10-PCS | Mod: 26,,, | Performed by: RADIOLOGY

## 2020-02-07 PROCEDURE — 71046 X-RAY EXAM CHEST 2 VIEWS: CPT | Mod: TC,FY

## 2020-02-07 PROCEDURE — 71046 X-RAY EXAM CHEST 2 VIEWS: CPT | Mod: 26,,, | Performed by: RADIOLOGY

## 2020-03-04 DIAGNOSIS — M54.12 CERVICAL RADICULOPATHY: ICD-10-CM

## 2020-03-04 DIAGNOSIS — K58.0 IRRITABLE BOWEL SYNDROME WITH DIARRHEA: Primary | ICD-10-CM

## 2020-03-04 RX ORDER — LOPERAMIDE HYDROCHLORIDE 2 MG/1
2 CAPSULE ORAL 4 TIMES DAILY PRN
Qty: 30 CAPSULE | Refills: 2 | Status: SHIPPED | OUTPATIENT
Start: 2020-03-04 | End: 2020-06-05 | Stop reason: SDUPTHER

## 2020-03-04 RX ORDER — DULOXETIN HYDROCHLORIDE 60 MG/1
60 CAPSULE, DELAYED RELEASE ORAL DAILY
Qty: 180 CAPSULE | Refills: 0 | Status: SHIPPED | OUTPATIENT
Start: 2020-03-04 | End: 2020-07-17 | Stop reason: SDUPTHER

## 2020-03-04 RX ORDER — DICYCLOMINE HYDROCHLORIDE 10 MG/1
20 CAPSULE ORAL 3 TIMES DAILY
Qty: 90 CAPSULE | Refills: 2 | Status: SHIPPED | OUTPATIENT
Start: 2020-03-04 | End: 2020-06-17 | Stop reason: SDUPTHER

## 2020-03-24 ENCOUNTER — PATIENT MESSAGE (OUTPATIENT)
Dept: FAMILY MEDICINE | Facility: HOSPITAL | Age: 45
End: 2020-03-24

## 2020-03-25 ENCOUNTER — PATIENT MESSAGE (OUTPATIENT)
Dept: FAMILY MEDICINE | Facility: HOSPITAL | Age: 45
End: 2020-03-25

## 2020-03-26 ENCOUNTER — DOCUMENTATION ONLY (OUTPATIENT)
Dept: REHABILITATION | Facility: HOSPITAL | Age: 45
End: 2020-03-26

## 2020-03-26 DIAGNOSIS — J42 CHRONIC BRONCHITIS, UNSPECIFIED CHRONIC BRONCHITIS TYPE: Primary | ICD-10-CM

## 2020-03-26 PROBLEM — M25.511 ACUTE PAIN OF RIGHT SHOULDER: Status: RESOLVED | Noted: 2019-07-02 | Resolved: 2020-03-26

## 2020-03-26 PROBLEM — Z78.9 IMPAIRED MOTOR CONTROL: Status: RESOLVED | Noted: 2019-07-02 | Resolved: 2020-03-26

## 2020-03-26 PROBLEM — M25.611 DECREASED RANGE OF MOTION OF RIGHT SHOULDER: Status: RESOLVED | Noted: 2019-07-02 | Resolved: 2020-03-26

## 2020-03-26 RX ORDER — ALBUTEROL SULFATE 90 UG/1
2 AEROSOL, METERED RESPIRATORY (INHALATION) EVERY 6 HOURS PRN
Qty: 18 G | Refills: 0 | Status: SHIPPED | OUTPATIENT
Start: 2020-03-26 | End: 2020-04-14 | Stop reason: SDUPTHER

## 2020-03-26 NOTE — PROGRESS NOTES
Outpatient Therapy Discharge Summary     Name: Brissa Dietz  Clinic Number: [id number]    Therapy Diagnosis:        Encounter Diagnoses   Name Primary?    Acute pain of right shoulder      Decreased range of motion of right shoulder      Impaired motor control        Physician: Curt Johnson MD     Physician Orders: PT Eval and Treat   Medical Diagnosis from Referral: M75.101 (ICD-10-CM) - Tear of right rotator cuff, unspecified tear extent  Evaluation Date: 7/2/2019        Date of Last visit: 7/2/19  Total Visits Received: 1  Cancelled Visits: 0  No Show Visits: 0    Assessment    Goals: not met; not assessed 2* unanticipated d/c      Discharge reason: Patient has not attended therapy since initial evaluation on 7/2/19    Plan   This patient is discharged from Physical Therapy    Art Ribeiro, PT, DPT  3/26/2020

## 2020-04-14 DIAGNOSIS — J42 CHRONIC BRONCHITIS, UNSPECIFIED CHRONIC BRONCHITIS TYPE: ICD-10-CM

## 2020-04-14 RX ORDER — ALBUTEROL SULFATE 90 UG/1
2 AEROSOL, METERED RESPIRATORY (INHALATION) EVERY 6 HOURS PRN
Qty: 18 G | Refills: 0 | Status: SHIPPED | OUTPATIENT
Start: 2020-04-14 | End: 2020-05-03 | Stop reason: SDUPTHER

## 2020-05-03 DIAGNOSIS — J42 CHRONIC BRONCHITIS, UNSPECIFIED CHRONIC BRONCHITIS TYPE: ICD-10-CM

## 2020-05-04 ENCOUNTER — PATIENT MESSAGE (OUTPATIENT)
Dept: FAMILY MEDICINE | Facility: HOSPITAL | Age: 45
End: 2020-05-04

## 2020-05-04 RX ORDER — ALBUTEROL SULFATE 90 UG/1
2 AEROSOL, METERED RESPIRATORY (INHALATION) EVERY 6 HOURS PRN
Qty: 18 G | Refills: 0 | Status: SHIPPED | OUTPATIENT
Start: 2020-05-04 | End: 2020-05-05

## 2020-05-05 DIAGNOSIS — J42 CHRONIC BRONCHITIS, UNSPECIFIED CHRONIC BRONCHITIS TYPE: Primary | ICD-10-CM

## 2020-05-05 DIAGNOSIS — M62.838 MUSCLE SPASM: ICD-10-CM

## 2020-05-05 RX ORDER — ALBUTEROL SULFATE 90 UG/1
2 AEROSOL, METERED RESPIRATORY (INHALATION) EVERY 6 HOURS PRN
Qty: 18 G | Refills: 0 | Status: SHIPPED | OUTPATIENT
Start: 2020-05-05 | End: 2020-06-05 | Stop reason: SDUPTHER

## 2020-05-05 RX ORDER — CYCLOBENZAPRINE HCL 10 MG
10 TABLET ORAL 3 TIMES DAILY PRN
Qty: 30 TABLET | Refills: 0 | Status: SHIPPED | OUTPATIENT
Start: 2020-05-05 | End: 2020-05-15

## 2020-06-05 ENCOUNTER — PATIENT MESSAGE (OUTPATIENT)
Dept: FAMILY MEDICINE | Facility: HOSPITAL | Age: 45
End: 2020-06-05

## 2020-06-05 DIAGNOSIS — J42 CHRONIC BRONCHITIS, UNSPECIFIED CHRONIC BRONCHITIS TYPE: ICD-10-CM

## 2020-06-05 DIAGNOSIS — M54.12 CERVICAL RADICULOPATHY: Primary | ICD-10-CM

## 2020-06-05 DIAGNOSIS — K58.0 IRRITABLE BOWEL SYNDROME WITH DIARRHEA: ICD-10-CM

## 2020-06-05 RX ORDER — ALBUTEROL SULFATE 90 UG/1
2 AEROSOL, METERED RESPIRATORY (INHALATION) EVERY 6 HOURS PRN
Qty: 18 G | Refills: 0 | Status: SHIPPED | OUTPATIENT
Start: 2020-06-05 | End: 2020-07-17 | Stop reason: SDUPTHER

## 2020-06-08 RX ORDER — LOPERAMIDE HYDROCHLORIDE 2 MG/1
2 CAPSULE ORAL 4 TIMES DAILY PRN
Qty: 30 CAPSULE | Refills: 2 | Status: SHIPPED | OUTPATIENT
Start: 2020-06-08 | End: 2020-08-24 | Stop reason: SDUPTHER

## 2020-06-08 RX ORDER — ALBUTEROL SULFATE 90 UG/1
2 AEROSOL, METERED RESPIRATORY (INHALATION) EVERY 6 HOURS PRN
Qty: 18 G | Refills: 0 | Status: SHIPPED | OUTPATIENT
Start: 2020-06-08 | End: 2020-07-17 | Stop reason: SDUPTHER

## 2020-06-12 ENCOUNTER — HOSPITAL ENCOUNTER (OUTPATIENT)
Dept: RADIOLOGY | Facility: HOSPITAL | Age: 45
Discharge: HOME | End: 2020-06-12
Attending: STUDENT IN AN ORGANIZED HEALTH CARE EDUCATION/TRAINING PROGRAM
Payer: MEDICAID

## 2020-06-12 DIAGNOSIS — M54.12 CERVICAL RADICULOPATHY: ICD-10-CM

## 2020-06-12 PROCEDURE — 72040 X-RAY EXAM NECK SPINE 2-3 VW: CPT | Mod: TC,FY

## 2020-06-12 PROCEDURE — 72040 XR CERVICAL SPINE AP LATERAL: ICD-10-PCS | Mod: 26,,, | Performed by: RADIOLOGY

## 2020-06-12 PROCEDURE — 72040 X-RAY EXAM NECK SPINE 2-3 VW: CPT | Mod: 26,,, | Performed by: RADIOLOGY

## 2020-06-17 DIAGNOSIS — K58.0 IRRITABLE BOWEL SYNDROME WITH DIARRHEA: ICD-10-CM

## 2020-06-18 RX ORDER — DICYCLOMINE HYDROCHLORIDE 10 MG/1
20 CAPSULE ORAL 3 TIMES DAILY
Qty: 90 CAPSULE | Refills: 2 | Status: SHIPPED | OUTPATIENT
Start: 2020-06-18 | End: 2020-11-23 | Stop reason: SDUPTHER

## 2020-07-25 ENCOUNTER — PATIENT MESSAGE (OUTPATIENT)
Dept: FAMILY MEDICINE | Facility: HOSPITAL | Age: 45
End: 2020-07-25

## 2020-07-25 DIAGNOSIS — J42 CHRONIC BRONCHITIS, UNSPECIFIED CHRONIC BRONCHITIS TYPE: ICD-10-CM

## 2020-07-25 DIAGNOSIS — Z12.31 ENCOUNTER FOR SCREENING MAMMOGRAM FOR BREAST CANCER: Primary | ICD-10-CM

## 2020-07-27 RX ORDER — ALBUTEROL SULFATE 90 UG/1
2 AEROSOL, METERED RESPIRATORY (INHALATION) EVERY 6 HOURS PRN
Qty: 18 G | Refills: 0 | Status: SHIPPED | OUTPATIENT
Start: 2020-07-27 | End: 2020-09-18 | Stop reason: SDUPTHER

## 2020-08-06 ENCOUNTER — HOSPITAL ENCOUNTER (OUTPATIENT)
Dept: RADIOLOGY | Facility: HOSPITAL | Age: 45
Discharge: HOME | End: 2020-08-06
Attending: STUDENT IN AN ORGANIZED HEALTH CARE EDUCATION/TRAINING PROGRAM
Payer: MEDICAID

## 2020-08-06 DIAGNOSIS — Z12.31 ENCOUNTER FOR SCREENING MAMMOGRAM FOR BREAST CANCER: ICD-10-CM

## 2020-08-06 PROCEDURE — 77067 SCR MAMMO BI INCL CAD: CPT | Mod: 26,,, | Performed by: RADIOLOGY

## 2020-08-06 PROCEDURE — 77063 BREAST TOMOSYNTHESIS BI: CPT | Mod: 26,,, | Performed by: RADIOLOGY

## 2020-08-06 PROCEDURE — 77067 SCR MAMMO BI INCL CAD: CPT | Mod: TC

## 2020-08-06 PROCEDURE — 77063 MAMMO DIGITAL SCREENING BILAT WITH TOMOSYNTHESIS_CAD: ICD-10-PCS | Mod: 26,,, | Performed by: RADIOLOGY

## 2020-08-06 PROCEDURE — 77067 MAMMO DIGITAL SCREENING BILAT WITH TOMOSYNTHESIS_CAD: ICD-10-PCS | Mod: 26,,, | Performed by: RADIOLOGY

## 2020-08-24 DIAGNOSIS — K58.0 IRRITABLE BOWEL SYNDROME WITH DIARRHEA: ICD-10-CM

## 2020-08-25 RX ORDER — LOPERAMIDE HYDROCHLORIDE 2 MG/1
2 CAPSULE ORAL 4 TIMES DAILY PRN
Qty: 90 CAPSULE | Refills: 2 | Status: SHIPPED | OUTPATIENT
Start: 2020-08-25

## 2020-09-18 DIAGNOSIS — M54.12 CERVICAL RADICULOPATHY: ICD-10-CM

## 2020-09-18 DIAGNOSIS — J42 CHRONIC BRONCHITIS, UNSPECIFIED CHRONIC BRONCHITIS TYPE: ICD-10-CM

## 2020-09-18 RX ORDER — ALBUTEROL SULFATE 90 UG/1
2 AEROSOL, METERED RESPIRATORY (INHALATION) EVERY 6 HOURS PRN
Qty: 18 G | Refills: 3 | Status: SHIPPED | OUTPATIENT
Start: 2020-09-18 | End: 2021-03-07 | Stop reason: SDUPTHER

## 2020-09-18 RX ORDER — GABAPENTIN 400 MG/1
400 CAPSULE ORAL 3 TIMES DAILY
Qty: 90 CAPSULE | Refills: 11 | Status: SHIPPED | OUTPATIENT
Start: 2020-09-18 | End: 2021-05-20 | Stop reason: ALTCHOICE

## 2020-10-04 ENCOUNTER — PATIENT MESSAGE (OUTPATIENT)
Dept: FAMILY MEDICINE | Facility: HOSPITAL | Age: 45
End: 2020-10-04

## 2020-10-09 ENCOUNTER — OFFICE VISIT (OUTPATIENT)
Dept: URGENT CARE | Facility: CLINIC | Age: 45
End: 2020-10-09
Payer: MEDICAID

## 2020-10-09 VITALS
TEMPERATURE: 98 F | BODY MASS INDEX: 27 KG/M2 | HEART RATE: 83 BPM | SYSTOLIC BLOOD PRESSURE: 115 MMHG | WEIGHT: 172 LBS | HEIGHT: 67 IN | DIASTOLIC BLOOD PRESSURE: 71 MMHG | OXYGEN SATURATION: 99 %

## 2020-10-09 DIAGNOSIS — R07.81 RIB PAIN: Primary | ICD-10-CM

## 2020-10-09 PROCEDURE — 99213 PR OFFICE/OUTPT VISIT, EST, LEVL III, 20-29 MIN: ICD-10-PCS | Mod: S$GLB,,, | Performed by: NURSE PRACTITIONER

## 2020-10-09 PROCEDURE — 71111 XR RIBS MIN 4 VIEWS W/ PA CHEST BILAT: ICD-10-PCS | Mod: S$GLB,,, | Performed by: RADIOLOGY

## 2020-10-09 PROCEDURE — 99213 OFFICE O/P EST LOW 20 MIN: CPT | Mod: S$GLB,,, | Performed by: NURSE PRACTITIONER

## 2020-10-09 PROCEDURE — 71111 X-RAY EXAM RIBS/CHEST4/> VWS: CPT | Mod: S$GLB,,, | Performed by: RADIOLOGY

## 2020-10-09 RX ORDER — KETOROLAC TROMETHAMINE 30 MG/ML
30 INJECTION, SOLUTION INTRAMUSCULAR; INTRAVENOUS
Status: COMPLETED | OUTPATIENT
Start: 2020-10-09 | End: 2020-10-09

## 2020-10-09 RX ADMIN — KETOROLAC TROMETHAMINE 30 MG: 30 INJECTION, SOLUTION INTRAMUSCULAR; INTRAVENOUS at 03:10

## 2020-10-09 NOTE — PATIENT INSTRUCTIONS
Rib Fracture (Broken Rib)  Your ribs are curved bones in your chest. They help protect your lungs and expand and contract when you breathe. Children's ribs bend easily and can often withstand a blow or fall. But adult ribs are more likely to break (fracture) under stress. Even coughing or a hard sneeze can fracture a rib.    When to go to the Emergency Room (ER)  Although they can be painful, most rib fractures aren't serious. But they often make it hard to cough or breathe deeply. Get medical care right away if you have:  · Trouble breathing.  · Nausea, vomiting, or stomach pain with a sore or bruised rib.  · Pain that worsens over time.  · An injury to the chest or stomach.  What to expect in the ER  Here is what will happen in the ER:   · A healthcare provider will ask about your injury and examine you carefully.  · An X-ray of your chest will likely be taken to show any major damage to ribs and lungs. However, ribs can undergo small breaks that do not show up on X-rays, even though they still hurt.  · You may be given medicine to ease your discomfort.  · Rarely, rib fractures can cause a lung to collapse or lead to bleeding in the chest. In these cases, a tube will be inserted into the chest to reinflate the lung or drain the blood.  Follow-up  You are likely to heal in 6 to 8 weeks. Most rib fractures heal on their own with no lasting effects. Call your healthcare provider right away if you notice any of these symptoms:  · Increased chest pain  · Shortness of breath  · Fever  · Coughing up blood  Date Last Reviewed: 9/26/2015  © [id number] [employer]. [address] [place] [address], [place] [address]. All rights reserved. This information is not intended as a substitute for professional medical care. Always follow your healthcare professional's instructions.

## 2020-10-09 NOTE — PROGRESS NOTES
"Subjective:       Patient ID: Brissa Dietz is a 45 y.o. female.    Vitals:  height is 5' 7" (1.702 m) and weight is 78 kg (172 lb). Her temperature is 98 °F (36.7 °C). Her blood pressure is 115/71 and her pulse is 83. Her oxygen saturation is 99%.     Chief Complaint: Rib Injury    Rib pain on both side under the breast since Sunday.  Pt states right side rib pain is more evident.  Pt states she fractured her ribs years ago.  Pt states she constantly refractured her ribs because she has a rough cough from bronchiatis.  Pt is concern for left shoulder numbness.  Pt unable to breathe while lying on back.  Pt has taken motrin with little relief.  Provider note begins below  Patient is postmenopausal and has never had a bone scan.  She is a smoker.    Trauma  The incident occurred 3 to 5 days ago. Injury mechanism: coughing. There is an injury to the chest (both ribs). It is unlikely that a foreign body is present. Associated symptoms include nausea. Pertinent negatives include no abdominal pain, light-headedness, loss of consciousness or weakness. There have been prior injuries to these areas.       Constitution: Positive for chills, sweating, fatigue and fever.   HENT: Negative for ear pain, facial swelling and facial trauma.    Gastrointestinal: Positive for nausea. Negative for abdominal trauma, abdominal pain and rectal bleeding.   Genitourinary: Negative for hematuria, missed menses, genital trauma and pelvic pain.   Musculoskeletal: Positive for pain, abnormal ROM of joint and back pain. Negative for trauma and joint swelling.   Skin: Negative for wound and laceration.   Neurological: Positive for dizziness. Negative for history of vertigo, light-headedness, coordination disturbances, altered mental status and loss of consciousness.   Hematologic/Lymphatic: Negative for history of bleeding disorder.   Psychiatric/Behavioral: Negative for altered mental status.       Objective:      Physical Exam "   Constitutional: She is oriented to person, place, and time.   HENT:   Head: Normocephalic and atraumatic.   Cardiovascular: Normal rate.   Pulmonary/Chest: Effort normal. No respiratory distress.   Abdominal: Normal appearance. There is abdominal tenderness in the right upper quadrant.   Neurological: She is alert and oriented to person, place, and time.   Skin: Skin is warm and dry. Psychiatric: Her behavior is normal. Mood normal.           Xr Rib Bilateral W/ Pa Chest    Result Date: 10/9/2020  EXAMINATION: XR RIBS MIN 4 VIEWS W/ PA CHEST BILAT CLINICAL HISTORY: Pleurodynia FINDINGS: Two views: Is there is a right posterolateral 6 rib fracture.  There are a few remote left rib fractures.  There are few remote right rib fractures.  No acute rib fractures are seen.  No pneumothorax pleural fluid or lung contusion seen.  Heart size is normal.  Lungs are clear.     Remote rib fractures as above.  No acute rib fracture or complication seen. Electronically signed by: Anil Arreola MD Date:    10/09/2020 Time:    15:02  Assessment:       1. Rib pain        Plan:       Old fractures noted on x ray.  Appears to have right rib fracture of the 6th rib.  Toradol injection.  Ice packs and rest.  Rib splinting for pain.    Patient advised to follow-up with PCP.  Recommend bone density testing due to frequent fractures.    Rib pain  -     XR RIB BILATERAL W/ PA CHEST; Future; Expected date: 10/09/2020  -     ketorolac injection 30 mg         Patient Instructions     Rib Fracture (Broken Rib)  Your ribs are curved bones in your chest. They help protect your lungs and expand and contract when you breathe. Children's ribs bend easily and can often withstand a blow or fall. But adult ribs are more likely to break (fracture) under stress. Even coughing or a hard sneeze can fracture a rib.    When to go to the Emergency Room (ER)  Although they can be painful, most rib fractures aren't serious. But they often make it hard to cough  or breathe deeply. Get medical care right away if you have:  · Trouble breathing.  · Nausea, vomiting, or stomach pain with a sore or bruised rib.  · Pain that worsens over time.  · An injury to the chest or stomach.  What to expect in the ER  Here is what will happen in the ER:   · A healthcare provider will ask about your injury and examine you carefully.  · An X-ray of your chest will likely be taken to show any major damage to ribs and lungs. However, ribs can undergo small breaks that do not show up on X-rays, even though they still hurt.  · You may be given medicine to ease your discomfort.  · Rarely, rib fractures can cause a lung to collapse or lead to bleeding in the chest. In these cases, a tube will be inserted into the chest to reinflate the lung or drain the blood.  Follow-up  You are likely to heal in 6 to 8 weeks. Most rib fractures heal on their own with no lasting effects. Call your healthcare provider right away if you notice any of these symptoms:  · Increased chest pain  · Shortness of breath  · Fever  · Coughing up blood  Date Last Reviewed: 9/26/2015  © [id number] [employer]. [address] [place] [address], [place] [address]. All rights reserved. This information is not intended as a substitute for professional medical care. Always follow your healthcare professional's instructions.

## 2020-10-14 ENCOUNTER — OFFICE VISIT (OUTPATIENT)
Dept: FAMILY MEDICINE | Facility: HOSPITAL | Age: 45
End: 2020-10-14
Attending: SPECIALIST
Payer: MEDICAID

## 2020-10-14 VITALS
BODY MASS INDEX: 28.93 KG/M2 | SYSTOLIC BLOOD PRESSURE: 118 MMHG | WEIGHT: 184.31 LBS | DIASTOLIC BLOOD PRESSURE: 81 MMHG | HEIGHT: 67 IN | HEART RATE: 81 BPM

## 2020-10-14 DIAGNOSIS — S22.43XD MULTIPLE CLOSED FRACTURES OF RIBS OF BOTH SIDES WITH ROUTINE HEALING, SUBSEQUENT ENCOUNTER: Primary | ICD-10-CM

## 2020-10-14 DIAGNOSIS — M54.12 CERVICAL RADICULOPATHY: ICD-10-CM

## 2020-10-14 PROCEDURE — 99214 OFFICE O/P EST MOD 30 MIN: CPT | Performed by: STUDENT IN AN ORGANIZED HEALTH CARE EDUCATION/TRAINING PROGRAM

## 2020-10-14 RX ORDER — PREGABALIN 75 MG/1
75 CAPSULE ORAL 2 TIMES DAILY
Qty: 60 CAPSULE | Refills: 1 | Status: SHIPPED | OUTPATIENT
Start: 2020-10-14 | End: 2020-11-11 | Stop reason: SDUPTHER

## 2020-10-14 RX ORDER — DICLOFENAC SODIUM 10 MG/G
2 GEL TOPICAL 4 TIMES DAILY PRN
Qty: 100 G | Refills: 1 | Status: SHIPPED | OUTPATIENT
Start: 2020-10-14 | End: 2022-04-11

## 2020-10-14 NOTE — PROGRESS NOTES
"Progress Note  [facility] - Urgent Care    THIS IS AN URGENT CARE APPOINTMENT    Subjective:     Brissa Dietz is a 45 y.o. year old female with PMHx of IBS, cervical radiculopathy, anxiety who presented to clinic today for an urgent care visit regarding rib pian. Patient was seen in urgent care within the past week for complaints of rib pain as well. She states that it started all of a sudden when she was coughing she felt a pop and all of a sudden she had strong rib pain. Pain is worse on the R than the left. Worse with deep inspiration, coughing, laughing, etc. Imaging showed "Remote rib fractures as above.  No acute rib fracture or complication seen." Discussed rib fractures with patient, she reported that they were 2/2 coughing. Patient has had multiple similar complaints in the past. Patient contineus to suffer from diffuse bilateral pain, not well controlled on her current medications.       Review of Systems   Constitutional: Positive for fever (subjective (tmax 99.7)) and malaise/fatigue. Negative for chills and diaphoresis.   HENT: Positive for congestion and sinus pain. Negative for sore throat.    Respiratory: Positive for cough (chronic), sputum production, shortness of breath and wheezing.    Cardiovascular: Negative for chest pain and palpitations.   Gastrointestinal: Positive for abdominal pain and nausea. Negative for constipation, diarrhea and vomiting.   Musculoskeletal: Positive for back pain, joint pain, myalgias and neck pain.   Neurological: Positive for dizziness, weakness and headaches. Negative for sensory change.   Psychiatric/Behavioral: Negative for depression. The patient is nervous/anxious.        Objective:     Vitals:    10/14/20 1519   BP: 118/81   Pulse: 81       Wt Readings from Last 1 Encounters:   10/14/20 83.6 kg (184 lb 4.9 oz)       Physical Exam  Constitutional:       General: She is not in acute distress.     Appearance: She is well-developed. She is not " diaphoretic.   HENT:      Head: Normocephalic and atraumatic.      Right Ear: External ear normal.      Left Ear: External ear normal.      Nose: Nose normal.      Mouth/Throat:      Pharynx: No oropharyngeal exudate.   Eyes:      Conjunctiva/sclera: Conjunctivae normal.      Pupils: Pupils are equal, round, and reactive to light.   Neck:      Musculoskeletal: Normal range of motion and neck supple.      Vascular: No JVD.   Cardiovascular:      Rate and Rhythm: Normal rate and regular rhythm.      Heart sounds: Normal heart sounds. No murmur.   Pulmonary:      Effort: No respiratory distress.      Breath sounds: Normal breath sounds. No wheezing.   Chest:      Chest wall: Tenderness present.   Abdominal:      General: Bowel sounds are normal. There is no distension.      Palpations: Abdomen is soft. There is no mass.      Tenderness: There is no abdominal tenderness.   Musculoskeletal:      Comments: Point tenderness across bilateral ribs anterior and posterior without any obvious distribution   Skin:     General: Skin is warm and dry.      Capillary Refill: Capillary refill takes less than 2 seconds.   Neurological:      Mental Status: She is alert and oriented to person, place, and time.   Psychiatric:         Behavior: Behavior normal.         Assessment/Plan:     Brissa was seen today for follow-up.    Diagnoses and all orders for this visit:    Multiple closed fractures of ribs of both sides with routine healing, subsequent encounter  -     diclofenac sodium (VOLTAREN) 1 % Gel; Apply 2 g topically 4 (four) times daily as needed.  -     CBC auto differential; Future  -     Comprehensive Metabolic Panel; Future  -     Lipid Panel; Future  -     TSH; Future  -     Hemoglobin A1C; Future  -     Calcitriol; Future  - Patient with multiple distant past rib fractures. Patient reports from coughing although unclear if that is etiology. Will check labs inclduing vitamin D; maybe patient has early osteoporosis? Still  wouldn't explain the extent of the fractures. Trauma? Talked to patient at length about symptomatic treatment.     Cervical radiculopathy  -     pregabalin (LYRICA) 75 MG capsule; Take 1 capsule (75 mg total) by mouth 2 (two) times daily.  - Pain not well controlled on current regimen. Will try adding lyrica and stopping gabapentin.         Case discussed with staff: Dr. Karrie Yin MD PGY-2  [facility]   10/15/2020 9:39 PM    This note was partially created using [employer] Voice Recognition software. Typographical and content errors may occur with this process. While efforts are made to detect and correct such errors, in some cases errors will persist. For this reason, wording in this document should be considered in the proper context and not strictly verbatim.

## 2020-10-20 ENCOUNTER — PATIENT MESSAGE (OUTPATIENT)
Dept: FAMILY MEDICINE | Facility: HOSPITAL | Age: 45
End: 2020-10-20

## 2020-10-25 ENCOUNTER — OFFICE VISIT (OUTPATIENT)
Dept: URGENT CARE | Facility: CLINIC | Age: 45
End: 2020-10-25
Payer: MEDICAID

## 2020-10-25 VITALS
TEMPERATURE: 97 F | SYSTOLIC BLOOD PRESSURE: 110 MMHG | RESPIRATION RATE: 16 BRPM | DIASTOLIC BLOOD PRESSURE: 75 MMHG | HEART RATE: 83 BPM | OXYGEN SATURATION: 98 %

## 2020-10-25 DIAGNOSIS — B02.9 HERPES ZOSTER WITHOUT COMPLICATION: Primary | ICD-10-CM

## 2020-10-25 PROCEDURE — 99214 OFFICE O/P EST MOD 30 MIN: CPT | Mod: S$GLB,,, | Performed by: PHYSICIAN ASSISTANT

## 2020-10-25 PROCEDURE — 99214 PR OFFICE/OUTPT VISIT, EST, LEVL IV, 30-39 MIN: ICD-10-PCS | Mod: S$GLB,,, | Performed by: PHYSICIAN ASSISTANT

## 2020-10-25 RX ORDER — TRIAMCINOLONE ACETONIDE 1 MG/G
OINTMENT TOPICAL 2 TIMES DAILY
Qty: 15 G | Refills: 0 | Status: SHIPPED | OUTPATIENT
Start: 2020-10-25 | End: 2021-11-09

## 2020-10-25 RX ORDER — VALACYCLOVIR HYDROCHLORIDE 1 G/1
1000 TABLET, FILM COATED ORAL 3 TIMES DAILY
Qty: 21 TABLET | Refills: 0 | Status: SHIPPED | OUTPATIENT
Start: 2020-10-25 | End: 2022-04-11

## 2020-10-25 NOTE — PATIENT INSTRUCTIONS
If your condition worsens or fails to improve we recommend that you receive another evaluation at the ER immediately or contact your PCP to discuss your concerns or return here. You must understand that you've received an urgent care treatment only and that you may be released before all your medical problems are known or treated. You the patient will arrange for follouwp care as instructed.     Cool compressed to affected areas at least 2-3 times a day.    Avoid picking or manipulating the wound.   Please take your antiviral medication to the completion.  Clean the wound twice a day and apply the topical cream as directed.     You should seek ER treatment if fever, increase pain, swelling, red streaks from affected area or other signs of increasing infection.     Tylenol or ibuprofen can also be used as directed for pain unless you have an allergy to them or medical condition such as stomach ulcers, kidney or liver disease or blood thinners etc for which you should not be taking these type of medications.         Shingles (Herpes Zoster)     Talk to your healthcare provider about the shingles vaccine.     Shingles is also called herpes zoster. It is a painful skin rash caused by the herpes zoster virus. This is the same virus that causes chickenpox. After a person has chickenpox, the virus remains inactive in the nerve cells. Years later, the virus can become active again and travel to the skin. Most people have shingles only once, but it is possible to have it more than once.  What are the risk factors for shingles?  Anyone who has ever had chickenpox can develop shingles. But your risk is greater if you:  · Are 50 years of age or older  · Have an illness that weakens your immune system, such as HIV/AIDS  · Have cancer, especially Hodgkin disease or lymphoma  · Take medicines that weaken your immune system  What are the symptoms of shingles?  · The first sign of shingles is usually pain, burning, tingling, or  itching on one part of your face or body. You may also feel as if you have the flu, with fever and chills.  · A red rash with small blisters appears within a few days. The rash may appear as follows:   ¨ The blisters can occur anywhere, but theyre most common on the back, chest, or abdomen.  ¨ They usually appear on only one side of the body, spreading along the nerve pathway where the virus was inactive.   ¨ The rash can also form around an eye, along one side of the face or neck, or in the mouth.  ¨ In a few people, usually those with weakened immune systems, shingles appear on more than one part of the body at once.  · After a few days, the blisters become dry and form a crust. The crust falls off in days to weeks. The blisters generally do not leave scars.  How is shingles treated?  For most people, shingles heals on its own in a few weeks. But treatment is recommended to help relieve pain, speed healing, and reduce the risk of complications. Antiviral medicines are prescribed within the first 72 hours of the appearance of the rash. To lessen symptoms:  · Apply ice packs (wrapped in a thin towel) or cool compresses, or soak in a cool bath.  · Use calamine lotion to calm itchy skin.  · Ask your healthcare provider about over-the-counter pain relievers. If your pain is severe, your healthcare provider may prescribe stronger pain medicines.  What are the complications of shingles?  Shingles often goes away with no lasting effects. But some people have serious problems long after the blisters have healed:  · Postherpetic neuralgia. This is the most common complication. It is severe nerve pain at the place where the rash used to be. It can last for months, or even years after you have had shingles. Medicines can be prescribed to help relieve the pain and improve quality of life.  · Bacterial infection. Shingles blisters may become infected with bacteria. Antibiotic medicine is used to treat the infection.  · Eye  problems. A person with shingles on the face should see his or her healthcare provider right away. Shingles can cause serious problems with vision, and even blindness.  Very rarely shingles can also lead to pneumonia, hearing problems, brain inflammation, or even death.   When to seek medical care  Contact your healthcare provider if you experience any of the following:  · Symptoms that dont go away with treatment  · A rash or blisters near your eye  · Increased drainage, fever, or rash after treatment, or severe pain that doesnt go away   How can shingles be prevented?  You can only get shingles if you have had chicken pox in the past. Those who have never had chickenpox can get the virus from you. Although instead of developing shingles, the person may get chickenpox. Until your blisters form scabs, avoid contact with others, especially the following:  · Pregnant women who have never had chickenpox or the vaccine  · Infants who were born early (prematurely) or who had low weight at birth  · People with weak immune system (for example, people receiving chemotherapy for cancer, people who have had organ transplants, or people with HIV infections)     The shingles vaccine  If youre 60 years of age or older, ask your healthcare provider if you should receive the shingles vaccine. The vaccine makes it less likely that you will develop shingles. If you do develop shingles, your symptoms will likely be milder than if you hadnt been vaccinated. Note: Although the vaccine is licensed for people 50 years of age or older, the CDC does not recommend the vaccine for those who are 50 to 59 years old.   Date Last Reviewed: 10/1/2016  © 3924-6344 [employer]. [address] [place] [address], [place] [address]. All rights reserved. This information is not intended as a substitute for professional medical care. Always follow your healthcare professional's instructions.

## 2020-10-25 NOTE — PROGRESS NOTES
Subjective:       Patient ID: Brissa Dietz is a 45 y.o. female.    Vitals:  temperature is 97.2 °F (36.2 °C). Her blood pressure is 110/75 and her pulse is 83. Her respiration is 16 and oxygen saturation is 98%.     Chief Complaint: Insect Bite    Pt c/o red burning and itchy rash on the back of the left side of her neck for the last couple of days. States she is unsure if something bit her or what as she cannot see the sea. She has tried benadryl cream with no relief. She does endorse a history of shingles.      Constitution: Negative for chills, sweating, fatigue and fever.   HENT: Negative for ear pain, postnasal drip, sinus pain, sinus pressure, sore throat and trouble swallowing.    Neck: Negative for neck pain, neck stiffness and painful lymph nodes.   Cardiovascular: Negative for chest pain, leg swelling and palpitations.   Eyes: Negative for double vision and blurred vision.   Respiratory: Negative for cough, sputum production, shortness of breath, stridor and wheezing.    Gastrointestinal: Negative for abdominal pain, nausea, vomiting and diarrhea.   Genitourinary: Negative for dysuria, frequency, urgency and history of kidney stones.   Musculoskeletal: Negative for muscle cramps.   Skin: Positive for rash. Negative for color change, pale, erythema and bruising.   Allergic/Immunologic: Negative for seasonal allergies and sneezing.   Neurological: Negative for dizziness, light-headedness, passing out, headaches, numbness and tingling.   Hematologic/Lymphatic: Negative for swollen lymph nodes.   Psychiatric/Behavioral: Negative for nervous/anxious, sleep disturbance and depression. The patient is not nervous/anxious.        Objective:      Physical Exam   Constitutional: She is oriented to person, place, and time. She appears well-developed.  Non-toxic appearance. She does not appear ill. No distress.   HENT:   Head: Normocephalic and atraumatic. Head is without abrasion, without contusion and without  laceration.   Ears:   Right Ear: External ear normal.   Left Ear: External ear normal.   Nose: Nose normal.   Mouth/Throat: Oropharynx is clear and moist and mucous membranes are normal.   Eyes: Pupils are equal, round, and reactive to light. Conjunctivae, EOM and lids are normal.   Neck: Trachea normal, normal range of motion, full passive range of motion without pain and phonation normal. Neck supple.   Cardiovascular: Normal rate, regular rhythm and normal heart sounds.   Pulmonary/Chest: Effort normal and breath sounds normal. No stridor. No respiratory distress. She has no wheezes. She has no rhonchi. She has no rales.   Abdominal: Normal appearance.   Musculoskeletal: Normal range of motion.   Lymphadenopathy:     She has no cervical adenopathy.   Neurological: She is alert and oriented to person, place, and time. She displays no weakness.   Skin: Skin is warm, dry, intact, not diaphoretic, rash and vesicular. Capillary refill takes less than 2 seconds. abrasion, burn, bruising, erythema and ecchymosis     Psychiatric: Her speech is normal and behavior is normal. Mood, judgment and thought content normal.   Nursing note and vitals reviewed.    Clinically well appearing, VSS. Clinical presentation consistent with shingles in the patient with a history of shingles. Will start on valtrex. Advised on symptomatic care. Advised on return/follow-up precautions. Advised on ER precautions. Answered all patient questions. Patient verbalized understanding and voiced agreement with current treatment plan.        Assessment:       1. Herpes zoster without complication        Plan:         Herpes zoster without complication  -     valACYclovir (VALTREX) 1000 MG tablet; Take 1 tablet (1,000 mg total) by mouth 3 (three) times daily. for 7 days  Dispense: 21 tablet; Refill: 0  -     triamcinolone acetonide 0.1% (KENALOG) 0.1 % ointment; Apply topically 2 (two) times daily.  Dispense: 15 g; Refill: 0      Patient Instructions        If your condition worsens or fails to improve we recommend that you receive another evaluation at the ER immediately or contact your PCP to discuss your concerns or return here. You must understand that you've received an urgent care treatment only and that you may be released before all your medical problems are known or treated. You the patient will arrange for follouwp care as instructed.     Cool compressed to affected areas at least 2-3 times a day.    Avoid picking or manipulating the wound.   Please take your antiviral medication to the completion.  Clean the wound twice a day and apply the topical cream as directed.     You should seek ER treatment if fever, increase pain, swelling, red streaks from affected area or other signs of increasing infection.     Tylenol or ibuprofen can also be used as directed for pain unless you have an allergy to them or medical condition such as stomach ulcers, kidney or liver disease or blood thinners etc for which you should not be taking these type of medications.         Shingles (Herpes Zoster)     Talk to your healthcare provider about the shingles vaccine.     Shingles is also called herpes zoster. It is a painful skin rash caused by the herpes zoster virus. This is the same virus that causes chickenpox. After a person has chickenpox, the virus remains inactive in the nerve cells. Years later, the virus can become active again and travel to the skin. Most people have shingles only once, but it is possible to have it more than once.  What are the risk factors for shingles?  Anyone who has ever had chickenpox can develop shingles. But your risk is greater if you:  · Are 50 years of age or older  · Have an illness that weakens your immune system, such as HIV/AIDS  · Have cancer, especially Hodgkin disease or lymphoma  · Take medicines that weaken your immune system  What are the symptoms of shingles?  · The first sign of shingles is usually pain, burning, tingling,  or itching on one part of your face or body. You may also feel as if you have the flu, with fever and chills.  · A red rash with small blisters appears within a few days. The rash may appear as follows:   ¨ The blisters can occur anywhere, but theyre most common on the back, chest, or abdomen.  ¨ They usually appear on only one side of the body, spreading along the nerve pathway where the virus was inactive.   ¨ The rash can also form around an eye, along one side of the face or neck, or in the mouth.  ¨ In a few people, usually those with weakened immune systems, shingles appear on more than one part of the body at once.  · After a few days, the blisters become dry and form a crust. The crust falls off in days to weeks. The blisters generally do not leave scars.  How is shingles treated?  For most people, shingles heals on its own in a few weeks. But treatment is recommended to help relieve pain, speed healing, and reduce the risk of complications. Antiviral medicines are prescribed within the first 72 hours of the appearance of the rash. To lessen symptoms:  · Apply ice packs (wrapped in a thin towel) or cool compresses, or soak in a cool bath.  · Use calamine lotion to calm itchy skin.  · Ask your healthcare provider about over-the-counter pain relievers. If your pain is severe, your healthcare provider may prescribe stronger pain medicines.  What are the complications of shingles?  Shingles often goes away with no lasting effects. But some people have serious problems long after the blisters have healed:  · Postherpetic neuralgia. This is the most common complication. It is severe nerve pain at the place where the rash used to be. It can last for months, or even years after you have had shingles. Medicines can be prescribed to help relieve the pain and improve quality of life.  · Bacterial infection. Shingles blisters may become infected with bacteria. Antibiotic medicine is used to treat the infection.  · Eye  problems. A person with shingles on the face should see his or her healthcare provider right away. Shingles can cause serious problems with vision, and even blindness.  Very rarely shingles can also lead to pneumonia, hearing problems, brain inflammation, or even death.   When to seek medical care  Contact your healthcare provider if you experience any of the following:  · Symptoms that dont go away with treatment  · A rash or blisters near your eye  · Increased drainage, fever, or rash after treatment, or severe pain that doesnt go away   How can shingles be prevented?  You can only get shingles if you have had chicken pox in the past. Those who have never had chickenpox can get the virus from you. Although instead of developing shingles, the person may get chickenpox. Until your blisters form scabs, avoid contact with others, especially the following:  · Pregnant women who have never had chickenpox or the vaccine  · Infants who were born early (prematurely) or who had low weight at birth  · People with weak immune system (for example, people receiving chemotherapy for cancer, people who have had organ transplants, or people with HIV infections)     The shingles vaccine  If youre 60 years of age or older, ask your healthcare provider if you should receive the shingles vaccine. The vaccine makes it less likely that you will develop shingles. If you do develop shingles, your symptoms will likely be milder than if you hadnt been vaccinated. Note: Although the vaccine is licensed for people 50 years of age or older, the CDC does not recommend the vaccine for those who are 50 to 59 years old.   Date Last Reviewed: 10/1/2016  © 3022-6144 [employer]. [address] [place] [address], [place] [address]. All rights reserved. This information is not intended as a substitute for professional medical care. Always follow your healthcare professional's instructions.

## 2020-11-02 ENCOUNTER — PATIENT MESSAGE (OUTPATIENT)
Dept: FAMILY MEDICINE | Facility: HOSPITAL | Age: 45
End: 2020-11-02

## 2020-11-11 DIAGNOSIS — M54.12 CERVICAL RADICULOPATHY: ICD-10-CM

## 2020-11-11 RX ORDER — PREGABALIN 75 MG/1
75 CAPSULE ORAL 2 TIMES DAILY
Qty: 60 CAPSULE | Refills: 5 | Status: SHIPPED | OUTPATIENT
Start: 2020-11-11 | End: 2021-05-20 | Stop reason: SDUPTHER

## 2020-11-12 ENCOUNTER — TELEPHONE (OUTPATIENT)
Dept: PHARMACY | Facility: CLINIC | Age: 45
End: 2020-11-12

## 2020-11-12 NOTE — TELEPHONE ENCOUNTER
Hello,    The prior authorization for Brissa Dietz's Pregabalin 75mg capsules has been APPROVED through 11/12/2021.    Left voicemail for patient to notify her of [place] approval.    Please let me know if you have any questions.    Thanks,  Dianna Umana, PharmD  [facility]   [phone]

## 2020-11-22 DIAGNOSIS — K58.0 IRRITABLE BOWEL SYNDROME WITH DIARRHEA: ICD-10-CM

## 2020-11-22 RX ORDER — DICYCLOMINE HYDROCHLORIDE 10 MG/1
20 CAPSULE ORAL 3 TIMES DAILY
Qty: 90 CAPSULE | Refills: 2 | Status: CANCELLED | OUTPATIENT
Start: 2020-11-22

## 2020-11-23 DIAGNOSIS — K58.0 IRRITABLE BOWEL SYNDROME WITH DIARRHEA: ICD-10-CM

## 2020-11-23 RX ORDER — DICYCLOMINE HYDROCHLORIDE 10 MG/1
20 CAPSULE ORAL 3 TIMES DAILY
Qty: 90 CAPSULE | Refills: 2 | Status: SHIPPED | OUTPATIENT
Start: 2020-11-23 | End: 2021-02-21 | Stop reason: SDUPTHER

## 2021-02-19 ENCOUNTER — PATIENT MESSAGE (OUTPATIENT)
Dept: FAMILY MEDICINE | Facility: HOSPITAL | Age: 46
End: 2021-02-19

## 2021-02-19 DIAGNOSIS — J42 CHRONIC BRONCHITIS, UNSPECIFIED CHRONIC BRONCHITIS TYPE: ICD-10-CM

## 2021-02-19 RX ORDER — ALBUTEROL SULFATE 90 UG/1
2 AEROSOL, METERED RESPIRATORY (INHALATION) EVERY 6 HOURS PRN
Qty: 18 G | Refills: 0 | OUTPATIENT
Start: 2021-02-19 | End: 2022-02-19

## 2021-02-21 DIAGNOSIS — K58.0 IRRITABLE BOWEL SYNDROME WITH DIARRHEA: ICD-10-CM

## 2021-02-22 RX ORDER — DICYCLOMINE HYDROCHLORIDE 10 MG/1
20 CAPSULE ORAL 3 TIMES DAILY
Qty: 90 CAPSULE | Refills: 2 | Status: SHIPPED | OUTPATIENT
Start: 2021-02-22 | End: 2021-06-15 | Stop reason: SDUPTHER

## 2021-03-08 DIAGNOSIS — J42 CHRONIC BRONCHITIS, UNSPECIFIED CHRONIC BRONCHITIS TYPE: ICD-10-CM

## 2021-03-09 RX ORDER — ALBUTEROL SULFATE 90 UG/1
2 AEROSOL, METERED RESPIRATORY (INHALATION) EVERY 6 HOURS PRN
Qty: 18 G | Refills: 0 | OUTPATIENT
Start: 2021-03-09 | End: 2022-03-09

## 2021-04-01 DIAGNOSIS — J42 CHRONIC BRONCHITIS, UNSPECIFIED CHRONIC BRONCHITIS TYPE: ICD-10-CM

## 2021-04-01 RX ORDER — ALBUTEROL SULFATE 90 UG/1
2 AEROSOL, METERED RESPIRATORY (INHALATION) EVERY 6 HOURS PRN
Qty: 18 G | Refills: 0 | Status: SHIPPED | OUTPATIENT
Start: 2021-04-01 | End: 2022-01-06 | Stop reason: SDUPTHER

## 2021-04-04 ENCOUNTER — OFFICE VISIT (OUTPATIENT)
Dept: URGENT CARE | Facility: CLINIC | Age: 46
End: 2021-04-04
Payer: MEDICAID

## 2021-04-04 VITALS
WEIGHT: 197 LBS | BODY MASS INDEX: 30.92 KG/M2 | DIASTOLIC BLOOD PRESSURE: 74 MMHG | SYSTOLIC BLOOD PRESSURE: 108 MMHG | OXYGEN SATURATION: 96 % | TEMPERATURE: 98 F | RESPIRATION RATE: 18 BRPM | HEIGHT: 67 IN | HEART RATE: 85 BPM

## 2021-04-04 DIAGNOSIS — M79.644 FINGER PAIN, RIGHT: ICD-10-CM

## 2021-04-04 DIAGNOSIS — F17.200 SMOKER: ICD-10-CM

## 2021-04-04 DIAGNOSIS — S63.634A SPRAIN OF INTERPHALANGEAL JOINT OF RIGHT RING FINGER, INITIAL ENCOUNTER: Primary | ICD-10-CM

## 2021-04-04 PROCEDURE — 99213 PR OFFICE/OUTPT VISIT, EST, LEVL III, 20-29 MIN: ICD-10-PCS | Mod: S$GLB,,, | Performed by: NURSE PRACTITIONER

## 2021-04-04 PROCEDURE — 73140 XR FINGER 2 OR MORE VIEWS RIGHT: ICD-10-PCS | Mod: RT,S$GLB,, | Performed by: RADIOLOGY

## 2021-04-04 PROCEDURE — 73140 X-RAY EXAM OF FINGER(S): CPT | Mod: RT,S$GLB,, | Performed by: RADIOLOGY

## 2021-04-04 PROCEDURE — 99213 OFFICE O/P EST LOW 20 MIN: CPT | Mod: S$GLB,,, | Performed by: NURSE PRACTITIONER

## 2021-04-16 ENCOUNTER — PATIENT MESSAGE (OUTPATIENT)
Dept: RESEARCH | Facility: HOSPITAL | Age: 46
End: 2021-04-16

## 2021-04-28 ENCOUNTER — OFFICE VISIT (OUTPATIENT)
Dept: FAMILY MEDICINE | Facility: HOSPITAL | Age: 46
End: 2021-04-28
Payer: MEDICAID

## 2021-04-28 ENCOUNTER — HOSPITAL ENCOUNTER (OUTPATIENT)
Dept: RADIOLOGY | Facility: HOSPITAL | Age: 46
Discharge: HOME | End: 2021-04-28
Attending: STUDENT IN AN ORGANIZED HEALTH CARE EDUCATION/TRAINING PROGRAM
Payer: MEDICAID

## 2021-04-28 VITALS
WEIGHT: 201.25 LBS | BODY MASS INDEX: 31.59 KG/M2 | SYSTOLIC BLOOD PRESSURE: 100 MMHG | HEIGHT: 67 IN | DIASTOLIC BLOOD PRESSURE: 66 MMHG | HEART RATE: 84 BPM

## 2021-04-28 DIAGNOSIS — M54.9 DORSALGIA, UNSPECIFIED: ICD-10-CM

## 2021-04-28 PROCEDURE — 96372 THER/PROPH/DIAG INJ SC/IM: CPT

## 2021-04-28 PROCEDURE — 72110 X-RAY EXAM L-2 SPINE 4/>VWS: CPT | Mod: TC,FY

## 2021-04-28 PROCEDURE — 99213 OFFICE O/P EST LOW 20 MIN: CPT | Mod: 25 | Performed by: STUDENT IN AN ORGANIZED HEALTH CARE EDUCATION/TRAINING PROGRAM

## 2021-04-28 PROCEDURE — 72110 X-RAY EXAM L-2 SPINE 4/>VWS: CPT | Mod: 26,,, | Performed by: RADIOLOGY

## 2021-04-28 PROCEDURE — 72110 XR LUMBAR SPINE 5 VIEW WITH FLEX AND EXT: ICD-10-PCS | Mod: 26,,, | Performed by: RADIOLOGY

## 2021-04-28 RX ADMIN — KETOROLAC TROMETHAMINE 30 MG: 30 INJECTION, SOLUTION INTRAMUSCULAR at 01:04

## 2021-04-29 ENCOUNTER — LAB VISIT (OUTPATIENT)
Dept: LAB | Facility: HOSPITAL | Age: 46
End: 2021-04-29
Attending: STUDENT IN AN ORGANIZED HEALTH CARE EDUCATION/TRAINING PROGRAM
Payer: MEDICAID

## 2021-04-29 DIAGNOSIS — M54.9 DORSALGIA, UNSPECIFIED: ICD-10-CM

## 2021-04-29 LAB
ALBUMIN SERPL BCP-MCNC: 3.9 G/DL (ref 3.5–5.2)
ALP SERPL-CCNC: 79 U/L (ref 55–135)
ALT SERPL W/O P-5'-P-CCNC: 21 U/L (ref 10–44)
ANION GAP SERPL CALC-SCNC: 10 MMOL/L (ref 8–16)
AST SERPL-CCNC: 23 U/L (ref 10–40)
BASOPHILS # BLD AUTO: 0.06 K/UL (ref 0–0.2)
BASOPHILS NFR BLD: 0.8 % (ref 0–1.9)
BILIRUB SERPL-MCNC: 0.7 MG/DL (ref 0.1–1)
BUN SERPL-MCNC: 8 MG/DL (ref 6–20)
CALCIUM SERPL-MCNC: 8.7 MG/DL (ref 8.7–10.5)
CHLORIDE SERPL-SCNC: 103 MMOL/L (ref 95–110)
CO2 SERPL-SCNC: 24 MMOL/L (ref 23–29)
CREAT SERPL-MCNC: 0.9 MG/DL (ref 0.5–1.4)
CRP SERPL-MCNC: 5.9 MG/L (ref 0–8.2)
DIFFERENTIAL METHOD: ABNORMAL
EOSINOPHIL # BLD AUTO: 0.3 K/UL (ref 0–0.5)
EOSINOPHIL NFR BLD: 3.3 % (ref 0–8)
ERYTHROCYTE [DISTWIDTH] IN BLOOD BY AUTOMATED COUNT: 13.9 % (ref 11.5–14.5)
ERYTHROCYTE [SEDIMENTATION RATE] IN BLOOD BY WESTERGREN METHOD: 33 MM/HR (ref 0–20)
EST. GFR  (AFRICAN AMERICAN): >60 ML/MIN/1.73 M^2
EST. GFR  (NON AFRICAN AMERICAN): >60 ML/MIN/1.73 M^2
GLUCOSE SERPL-MCNC: 80 MG/DL (ref 70–110)
HCT VFR BLD AUTO: 39 % (ref 37–48.5)
HGB BLD-MCNC: 13.1 G/DL (ref 12–16)
IMM GRANULOCYTES # BLD AUTO: 0.02 K/UL (ref 0–0.04)
IMM GRANULOCYTES NFR BLD AUTO: 0.3 % (ref 0–0.5)
LYMPHOCYTES # BLD AUTO: 2.6 K/UL (ref 1–4.8)
LYMPHOCYTES NFR BLD: 33.5 % (ref 18–48)
MCH RBC QN AUTO: 31.3 PG (ref 27–31)
MCHC RBC AUTO-ENTMCNC: 33.6 G/DL (ref 32–36)
MCV RBC AUTO: 93 FL (ref 82–98)
MONOCYTES # BLD AUTO: 0.6 K/UL (ref 0.3–1)
MONOCYTES NFR BLD: 7.7 % (ref 4–15)
NEUTROPHILS # BLD AUTO: 4.3 K/UL (ref 1.8–7.7)
NEUTROPHILS NFR BLD: 54.4 % (ref 38–73)
NRBC BLD-RTO: 0 /100 WBC
PLATELET # BLD AUTO: 375 K/UL (ref 150–450)
PMV BLD AUTO: 9.7 FL (ref 9.2–12.9)
POTASSIUM SERPL-SCNC: 3.6 MMOL/L (ref 3.5–5.1)
PROT SERPL-MCNC: 7.2 G/DL (ref 6–8.4)
RBC # BLD AUTO: 4.18 M/UL (ref 4–5.4)
SODIUM SERPL-SCNC: 137 MMOL/L (ref 136–145)
WBC # BLD AUTO: 7.81 K/UL (ref 3.9–12.7)

## 2021-04-29 PROCEDURE — 36415 COLL VENOUS BLD VENIPUNCTURE: CPT | Performed by: STUDENT IN AN ORGANIZED HEALTH CARE EDUCATION/TRAINING PROGRAM

## 2021-04-29 PROCEDURE — 80053 COMPREHEN METABOLIC PANEL: CPT | Performed by: STUDENT IN AN ORGANIZED HEALTH CARE EDUCATION/TRAINING PROGRAM

## 2021-04-29 PROCEDURE — 84165 PROTEIN E-PHORESIS SERUM: CPT | Mod: 26,,, | Performed by: PATHOLOGY

## 2021-04-29 PROCEDURE — 84165 PROTEIN E-PHORESIS SERUM: CPT | Performed by: STUDENT IN AN ORGANIZED HEALTH CARE EDUCATION/TRAINING PROGRAM

## 2021-04-29 PROCEDURE — 85025 COMPLETE CBC W/AUTO DIFF WBC: CPT | Performed by: STUDENT IN AN ORGANIZED HEALTH CARE EDUCATION/TRAINING PROGRAM

## 2021-04-29 PROCEDURE — 86140 C-REACTIVE PROTEIN: CPT | Performed by: STUDENT IN AN ORGANIZED HEALTH CARE EDUCATION/TRAINING PROGRAM

## 2021-04-29 PROCEDURE — 84165 PATHOLOGIST INTERPRETATION SPE: ICD-10-PCS | Mod: 26,,, | Performed by: PATHOLOGY

## 2021-04-29 PROCEDURE — 85652 RBC SED RATE AUTOMATED: CPT | Performed by: STUDENT IN AN ORGANIZED HEALTH CARE EDUCATION/TRAINING PROGRAM

## 2021-04-29 RX ORDER — KETOROLAC TROMETHAMINE 30 MG/ML
30 INJECTION, SOLUTION INTRAMUSCULAR; INTRAVENOUS
Status: COMPLETED | OUTPATIENT
Start: 2021-04-28 | End: 2021-04-28

## 2021-04-30 LAB
ALBUMIN SERPL ELPH-MCNC: 4.1 G/DL (ref 3.35–5.55)
ALPHA1 GLOB SERPL ELPH-MCNC: 0.31 G/DL (ref 0.17–0.41)
ALPHA2 GLOB SERPL ELPH-MCNC: 0.81 G/DL (ref 0.43–0.99)
B-GLOBULIN SERPL ELPH-MCNC: 0.7 G/DL (ref 0.5–1.1)
GAMMA GLOB SERPL ELPH-MCNC: 0.88 G/DL (ref 0.67–1.58)
PROT SERPL-MCNC: 6.8 G/DL (ref 6–8.4)

## 2021-05-03 LAB — PATHOLOGIST INTERPRETATION SPE: NORMAL

## 2021-05-06 ENCOUNTER — PATIENT MESSAGE (OUTPATIENT)
Dept: FAMILY MEDICINE | Facility: HOSPITAL | Age: 46
End: 2021-05-06

## 2021-05-10 ENCOUNTER — PATIENT MESSAGE (OUTPATIENT)
Dept: FAMILY MEDICINE | Facility: HOSPITAL | Age: 46
End: 2021-05-10

## 2021-05-10 DIAGNOSIS — J42 CHRONIC BRONCHITIS, UNSPECIFIED CHRONIC BRONCHITIS TYPE: Primary | ICD-10-CM

## 2021-05-10 RX ORDER — ALBUTEROL SULFATE 0.83 MG/ML
2.5 SOLUTION RESPIRATORY (INHALATION) EVERY 6 HOURS PRN
Qty: 90 ML | Refills: 2 | Status: SHIPPED | OUTPATIENT
Start: 2021-05-10 | End: 2022-01-06 | Stop reason: SDUPTHER

## 2021-05-11 ENCOUNTER — TELEPHONE (OUTPATIENT)
Dept: ADMINISTRATIVE | Facility: OTHER | Age: 46
End: 2021-05-11

## 2021-05-20 DIAGNOSIS — M54.12 CERVICAL RADICULOPATHY: ICD-10-CM

## 2021-05-20 RX ORDER — PREGABALIN 75 MG/1
75 CAPSULE ORAL 2 TIMES DAILY
Qty: 60 CAPSULE | Refills: 5 | Status: SHIPPED | OUTPATIENT
Start: 2021-05-20 | End: 2021-11-30 | Stop reason: SDUPTHER

## 2021-06-15 DIAGNOSIS — K58.0 IRRITABLE BOWEL SYNDROME WITH DIARRHEA: ICD-10-CM

## 2021-06-15 RX ORDER — DICYCLOMINE HYDROCHLORIDE 10 MG/1
20 CAPSULE ORAL 3 TIMES DAILY
Qty: 90 CAPSULE | Refills: 2 | Status: SHIPPED | OUTPATIENT
Start: 2021-06-15 | End: 2021-10-26 | Stop reason: SDUPTHER

## 2021-07-17 DIAGNOSIS — M54.12 CERVICAL RADICULOPATHY: ICD-10-CM

## 2021-07-19 RX ORDER — DULOXETIN HYDROCHLORIDE 60 MG/1
60 CAPSULE, DELAYED RELEASE ORAL DAILY
Qty: 90 CAPSULE | Refills: 0 | Status: SHIPPED | OUTPATIENT
Start: 2021-07-19 | End: 2021-10-26 | Stop reason: SDUPTHER

## 2021-10-08 DIAGNOSIS — J42 CHRONIC BRONCHITIS, UNSPECIFIED CHRONIC BRONCHITIS TYPE: ICD-10-CM

## 2021-10-08 RX ORDER — ALBUTEROL SULFATE 90 UG/1
2 AEROSOL, METERED RESPIRATORY (INHALATION) EVERY 6 HOURS PRN
Qty: 18 G | Refills: 3 | Status: SHIPPED | OUTPATIENT
Start: 2021-10-08 | End: 2022-04-04 | Stop reason: SDUPTHER

## 2021-10-26 DIAGNOSIS — K58.0 IRRITABLE BOWEL SYNDROME WITH DIARRHEA: ICD-10-CM

## 2021-10-26 DIAGNOSIS — M54.12 CERVICAL RADICULOPATHY: ICD-10-CM

## 2021-10-26 RX ORDER — DICYCLOMINE HYDROCHLORIDE 10 MG/1
20 CAPSULE ORAL 3 TIMES DAILY
Qty: 90 CAPSULE | Refills: 2 | Status: SHIPPED | OUTPATIENT
Start: 2021-10-26 | End: 2022-01-29 | Stop reason: SDUPTHER

## 2021-10-26 RX ORDER — DULOXETIN HYDROCHLORIDE 60 MG/1
60 CAPSULE, DELAYED RELEASE ORAL DAILY
Qty: 90 CAPSULE | Refills: 0 | Status: SHIPPED | OUTPATIENT
Start: 2021-10-26 | End: 2022-01-28 | Stop reason: SDUPTHER

## 2021-11-09 ENCOUNTER — HOSPITAL ENCOUNTER (OUTPATIENT)
Dept: RADIOLOGY | Facility: HOSPITAL | Age: 46
Discharge: HOME | End: 2021-11-09
Attending: STUDENT IN AN ORGANIZED HEALTH CARE EDUCATION/TRAINING PROGRAM
Payer: MEDICAID

## 2021-11-09 ENCOUNTER — OFFICE VISIT (OUTPATIENT)
Dept: FAMILY MEDICINE | Facility: HOSPITAL | Age: 46
End: 2021-11-09
Attending: FAMILY MEDICINE
Payer: MEDICAID

## 2021-11-09 VITALS
DIASTOLIC BLOOD PRESSURE: 76 MMHG | BODY MASS INDEX: 31.59 KG/M2 | SYSTOLIC BLOOD PRESSURE: 110 MMHG | HEIGHT: 67 IN | WEIGHT: 201.25 LBS | HEART RATE: 73 BPM

## 2021-11-09 DIAGNOSIS — M25.562 CHRONIC PAIN OF BOTH KNEES: ICD-10-CM

## 2021-11-09 DIAGNOSIS — G89.29 CHRONIC PAIN OF LEFT KNEE: Primary | ICD-10-CM

## 2021-11-09 DIAGNOSIS — G89.29 CHRONIC PAIN OF BOTH KNEES: ICD-10-CM

## 2021-11-09 DIAGNOSIS — M25.561 CHRONIC PAIN OF BOTH KNEES: ICD-10-CM

## 2021-11-09 DIAGNOSIS — M25.562 CHRONIC PAIN OF LEFT KNEE: Primary | ICD-10-CM

## 2021-11-09 PROCEDURE — 99214 OFFICE O/P EST MOD 30 MIN: CPT | Performed by: STUDENT IN AN ORGANIZED HEALTH CARE EDUCATION/TRAINING PROGRAM

## 2021-11-09 PROCEDURE — 73562 XR KNEE ORTHO BILAT: ICD-10-PCS | Mod: 26,50,, | Performed by: RADIOLOGY

## 2021-11-09 PROCEDURE — 73562 X-RAY EXAM OF KNEE 3: CPT | Mod: 26,50,, | Performed by: RADIOLOGY

## 2021-11-09 PROCEDURE — 73562 X-RAY EXAM OF KNEE 3: CPT | Mod: TC,50,FY

## 2021-11-09 RX ORDER — TRIAMCINOLONE ACETONIDE 40 MG/ML
40 INJECTION, SUSPENSION INTRA-ARTICULAR; INTRAMUSCULAR
Status: DISCONTINUED | OUTPATIENT
Start: 2021-11-09 | End: 2021-11-10 | Stop reason: HOSPADM

## 2021-11-09 RX ADMIN — TRIAMCINOLONE ACETONIDE 40 MG: 40 INJECTION, SUSPENSION INTRA-ARTICULAR; INTRAMUSCULAR at 01:11

## 2021-11-30 DIAGNOSIS — M54.12 CERVICAL RADICULOPATHY: ICD-10-CM

## 2021-11-30 RX ORDER — PREGABALIN 75 MG/1
75 CAPSULE ORAL 2 TIMES DAILY
Qty: 60 CAPSULE | Refills: 5 | Status: SHIPPED | OUTPATIENT
Start: 2021-11-30 | End: 2022-07-03 | Stop reason: SDUPTHER

## 2021-12-06 ENCOUNTER — TELEPHONE (OUTPATIENT)
Dept: PHARMACY | Facility: CLINIC | Age: 46
End: 2021-12-06
Payer: MEDICAID

## 2022-01-06 DIAGNOSIS — J42 CHRONIC BRONCHITIS, UNSPECIFIED CHRONIC BRONCHITIS TYPE: ICD-10-CM

## 2022-01-07 RX ORDER — ALBUTEROL SULFATE 0.83 MG/ML
2.5 SOLUTION RESPIRATORY (INHALATION) EVERY 6 HOURS PRN
Qty: 90 ML | Refills: 2 | Status: SHIPPED | OUTPATIENT
Start: 2022-01-07 | End: 2022-08-20 | Stop reason: SDUPTHER

## 2022-01-07 RX ORDER — ALBUTEROL SULFATE 90 UG/1
2 AEROSOL, METERED RESPIRATORY (INHALATION) EVERY 6 HOURS PRN
Qty: 18 G | Refills: 0 | Status: SHIPPED | OUTPATIENT
Start: 2022-01-07 | End: 2022-02-23 | Stop reason: SDUPTHER

## 2022-01-28 DIAGNOSIS — M54.12 CERVICAL RADICULOPATHY: ICD-10-CM

## 2022-01-28 RX ORDER — DULOXETIN HYDROCHLORIDE 60 MG/1
60 CAPSULE, DELAYED RELEASE ORAL DAILY
Qty: 90 CAPSULE | Refills: 0 | Status: SHIPPED | OUTPATIENT
Start: 2022-01-28 | End: 2022-03-28 | Stop reason: SDUPTHER

## 2022-01-29 DIAGNOSIS — K58.0 IRRITABLE BOWEL SYNDROME WITH DIARRHEA: ICD-10-CM

## 2022-01-31 RX ORDER — DICYCLOMINE HYDROCHLORIDE 10 MG/1
20 CAPSULE ORAL 3 TIMES DAILY
Qty: 90 CAPSULE | Refills: 2 | Status: SHIPPED | OUTPATIENT
Start: 2022-01-31 | End: 2022-05-17 | Stop reason: SDUPTHER

## 2022-02-23 DIAGNOSIS — J42 CHRONIC BRONCHITIS, UNSPECIFIED CHRONIC BRONCHITIS TYPE: ICD-10-CM

## 2022-02-23 RX ORDER — ALBUTEROL SULFATE 90 UG/1
2 AEROSOL, METERED RESPIRATORY (INHALATION) EVERY 6 HOURS PRN
Qty: 18 G | Refills: 0 | Status: SHIPPED | OUTPATIENT
Start: 2022-02-23 | End: 2022-04-04 | Stop reason: SDUPTHER

## 2022-03-20 DIAGNOSIS — J42 CHRONIC BRONCHITIS, UNSPECIFIED CHRONIC BRONCHITIS TYPE: ICD-10-CM

## 2022-03-21 RX ORDER — ALBUTEROL SULFATE 90 UG/1
2 AEROSOL, METERED RESPIRATORY (INHALATION) EVERY 6 HOURS PRN
Qty: 18 G | Refills: 0 | OUTPATIENT
Start: 2022-03-21 | End: 2023-03-21

## 2022-03-23 DIAGNOSIS — J42 CHRONIC BRONCHITIS, UNSPECIFIED CHRONIC BRONCHITIS TYPE: ICD-10-CM

## 2022-03-23 RX ORDER — ALBUTEROL SULFATE 90 UG/1
2 AEROSOL, METERED RESPIRATORY (INHALATION) EVERY 6 HOURS PRN
Qty: 18 G | Refills: 0 | OUTPATIENT
Start: 2022-03-23 | End: 2023-03-23

## 2022-03-23 RX ORDER — ALBUTEROL SULFATE 90 UG/1
2 AEROSOL, METERED RESPIRATORY (INHALATION) EVERY 6 HOURS PRN
Qty: 18 G | Refills: 3 | OUTPATIENT
Start: 2022-03-23 | End: 2023-03-23

## 2022-03-28 DIAGNOSIS — M54.12 CERVICAL RADICULOPATHY: ICD-10-CM

## 2022-03-28 DIAGNOSIS — J42 CHRONIC BRONCHITIS, UNSPECIFIED CHRONIC BRONCHITIS TYPE: ICD-10-CM

## 2022-03-28 RX ORDER — ALBUTEROL SULFATE 90 UG/1
2 AEROSOL, METERED RESPIRATORY (INHALATION) EVERY 6 HOURS PRN
Qty: 18 G | Refills: 0 | Status: CANCELLED | OUTPATIENT
Start: 2022-03-28 | End: 2023-03-28

## 2022-03-30 DIAGNOSIS — J42 CHRONIC BRONCHITIS, UNSPECIFIED CHRONIC BRONCHITIS TYPE: ICD-10-CM

## 2022-03-30 RX ORDER — DULOXETIN HYDROCHLORIDE 60 MG/1
60 CAPSULE, DELAYED RELEASE ORAL DAILY
Qty: 90 CAPSULE | Refills: 0 | Status: SHIPPED | OUTPATIENT
Start: 2022-03-30 | End: 2022-08-22 | Stop reason: SDUPTHER

## 2022-03-30 RX ORDER — ALBUTEROL SULFATE 90 UG/1
2 AEROSOL, METERED RESPIRATORY (INHALATION) EVERY 6 HOURS PRN
Qty: 18 G | Refills: 0 | OUTPATIENT
Start: 2022-03-30 | End: 2023-03-30

## 2022-03-31 ENCOUNTER — PATIENT MESSAGE (OUTPATIENT)
Dept: FAMILY MEDICINE | Facility: HOSPITAL | Age: 47
End: 2022-03-31
Payer: MEDICAID

## 2022-03-31 DIAGNOSIS — J42 CHRONIC BRONCHITIS, UNSPECIFIED CHRONIC BRONCHITIS TYPE: ICD-10-CM

## 2022-03-31 RX ORDER — ALBUTEROL SULFATE 90 UG/1
2 AEROSOL, METERED RESPIRATORY (INHALATION) EVERY 6 HOURS PRN
Qty: 18 G | Refills: 3 | OUTPATIENT
Start: 2022-03-31 | End: 2023-03-31

## 2022-04-04 DIAGNOSIS — J42 CHRONIC BRONCHITIS, UNSPECIFIED CHRONIC BRONCHITIS TYPE: ICD-10-CM

## 2022-04-04 RX ORDER — ALBUTEROL SULFATE 90 UG/1
2 AEROSOL, METERED RESPIRATORY (INHALATION) EVERY 6 HOURS PRN
Qty: 18 G | Refills: 3 | Status: SHIPPED | OUTPATIENT
Start: 2022-04-04 | End: 2022-06-30 | Stop reason: SDUPTHER

## 2022-04-04 RX ORDER — ALBUTEROL SULFATE 90 UG/1
2 AEROSOL, METERED RESPIRATORY (INHALATION) EVERY 6 HOURS PRN
Qty: 18 G | Refills: 0 | Status: SHIPPED | OUTPATIENT
Start: 2022-04-04 | End: 2022-04-04 | Stop reason: SDUPTHER

## 2022-04-11 ENCOUNTER — PATIENT MESSAGE (OUTPATIENT)
Dept: FAMILY MEDICINE | Facility: HOSPITAL | Age: 47
End: 2022-04-11

## 2022-04-11 ENCOUNTER — OFFICE VISIT (OUTPATIENT)
Dept: FAMILY MEDICINE | Facility: HOSPITAL | Age: 47
End: 2022-04-11
Attending: STUDENT IN AN ORGANIZED HEALTH CARE EDUCATION/TRAINING PROGRAM
Payer: MEDICAID

## 2022-04-11 VITALS
HEART RATE: 92 BPM | DIASTOLIC BLOOD PRESSURE: 85 MMHG | WEIGHT: 206.38 LBS | BODY MASS INDEX: 32.39 KG/M2 | SYSTOLIC BLOOD PRESSURE: 131 MMHG | HEIGHT: 67 IN

## 2022-04-11 DIAGNOSIS — N76.4 ABSCESS OF LABIA MAJORA: Primary | ICD-10-CM

## 2022-04-11 PROCEDURE — 99213 OFFICE O/P EST LOW 20 MIN: CPT | Performed by: STUDENT IN AN ORGANIZED HEALTH CARE EDUCATION/TRAINING PROGRAM

## 2022-04-11 RX ORDER — SULFAMETHOXAZOLE AND TRIMETHOPRIM 800; 160 MG/1; MG/1
1 TABLET ORAL 2 TIMES DAILY
Qty: 10 TABLET | Refills: 0 | Status: SHIPPED | OUTPATIENT
Start: 2022-04-11 | End: 2022-04-17

## 2022-04-11 NOTE — PROGRESS NOTES
"Subjective:       Patient ID: Brissa Dietz is a 47 y.o. female.    Chief Complaint: Abscess    Patient is a 48yo F here for urgent visit for vaginal pain. Pain started 2 days ago and feels like a "knot." She has difficulty sitting. She has been using neosporin with topical anesthetic to help with the pain and it does help. She shaves intermittently and has not had a similar presentation in this place before. She denies drainage of the area. She feels the area on the outside of her left labia.     Objective:      Vitals:    04/11/22 1505   BP: 131/85   Pulse: 92     Physical Exam  Genitourinary:              Incision & Drainage    Date/Time: 4/11/2022 3:22 PM  Performed by: Kathryn Caicedo MD  Authorized by: Kathryn Caicedo MD     Time out: Immediately prior to procedure a "time out" was called to verify the correct patient, procedure, equipment, support staff and site/side marked as required (Timeout at 1521).    Consent Done?:  Yes (Written)    Type:  Abscess  Body area:  Anogenital (Left labia majora)  Anesthesia:  Local infiltration  Local anesthetic: lidocaine 2% with epinephrine  Anesthetic total (ml):  1  Scalpel size:  11  Incision type:  Single straight  Incision depth: dermal    Complexity:  Simple  Drainage:  Serosanguinous  Drainage amount:  Scant  Wound treatment:  Wound left open  Packing material:  None  Patient tolerance:  Patient tolerated the procedure well with no immediate complications    Drainage expressed, ending size of induration approximately 1cm. Wound culture taken.        Assessment:       1. Abscess of labia majora        Plan:       Abscess of labia majora  -     sulfamethoxazole-trimethoprim 800-160mg (BACTRIM DS) 800-160 mg Tab; Take 1 tablet by mouth 2 (two) times daily. for 5 days  Dispense: 10 tablet; Refill: 0  -     I&D performed with no immediate complications. Aftercare instructions given including risk of recurrence. Abx for potential infection, however drainage " appeared serosanguinous.       Follow up if symptoms worsen or fail to improve.

## 2022-05-12 ENCOUNTER — OFFICE VISIT (OUTPATIENT)
Dept: FAMILY MEDICINE | Facility: HOSPITAL | Age: 47
End: 2022-05-12
Attending: FAMILY MEDICINE
Payer: MEDICAID

## 2022-05-12 VITALS
DIASTOLIC BLOOD PRESSURE: 76 MMHG | SYSTOLIC BLOOD PRESSURE: 115 MMHG | BODY MASS INDEX: 32.87 KG/M2 | WEIGHT: 209.44 LBS | HEIGHT: 67 IN | HEART RATE: 85 BPM

## 2022-05-12 DIAGNOSIS — M25.562 CHRONIC PAIN OF BOTH KNEES: ICD-10-CM

## 2022-05-12 DIAGNOSIS — S22.32XA CLOSED FRACTURE OF ONE RIB OF LEFT SIDE, INITIAL ENCOUNTER: Primary | ICD-10-CM

## 2022-05-12 DIAGNOSIS — G89.29 CHRONIC PAIN OF BOTH KNEES: ICD-10-CM

## 2022-05-12 DIAGNOSIS — M25.561 CHRONIC PAIN OF BOTH KNEES: ICD-10-CM

## 2022-05-12 PROCEDURE — 99213 OFFICE O/P EST LOW 20 MIN: CPT | Performed by: STUDENT IN AN ORGANIZED HEALTH CARE EDUCATION/TRAINING PROGRAM

## 2022-05-12 PROCEDURE — 96372 THER/PROPH/DIAG INJ SC/IM: CPT

## 2022-05-12 RX ORDER — KETOROLAC TROMETHAMINE 30 MG/ML
30 INJECTION, SOLUTION INTRAMUSCULAR; INTRAVENOUS
Status: COMPLETED | OUTPATIENT
Start: 2022-05-12 | End: 2022-05-12

## 2022-05-12 RX ADMIN — KETOROLAC TROMETHAMINE 30 MG: 30 INJECTION, SOLUTION INTRAMUSCULAR; INTRAVENOUS at 03:05
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
## 2022-05-12 NOTE — PROGRESS NOTES
Toradol 30 mg Injection given. Bandage applied. Tolerated well. Patient instructed to wait in lobby for 15 min before leaving.Information on medication given. Verbalized understanding.

## 2022-05-17 DIAGNOSIS — K58.0 IRRITABLE BOWEL SYNDROME WITH DIARRHEA: ICD-10-CM

## 2022-05-17 RX ORDER — DICYCLOMINE HYDROCHLORIDE 10 MG/1
20 CAPSULE ORAL 3 TIMES DAILY
Qty: 90 CAPSULE | Refills: 2 | Status: SHIPPED | OUTPATIENT
Start: 2022-05-17 | End: 2022-09-04 | Stop reason: SDUPTHER

## 2022-06-22 ENCOUNTER — DOCUMENTATION ONLY (OUTPATIENT)
Dept: REHABILITATION | Facility: HOSPITAL | Age: 47
End: 2022-06-22
Payer: MEDICAID

## 2022-06-22 NOTE — PROGRESS NOTES
Pt no showed PT initial evaluation on 6/22/22. This is pt's first no show for this referral.     Fabiola Parker, PT, DPT  06/22/2022

## 2022-06-30 DIAGNOSIS — J42 CHRONIC BRONCHITIS, UNSPECIFIED CHRONIC BRONCHITIS TYPE: ICD-10-CM

## 2022-06-30 RX ORDER — ALBUTEROL SULFATE 90 UG/1
2 AEROSOL, METERED RESPIRATORY (INHALATION) EVERY 6 HOURS PRN
Qty: 18 G | Refills: 3 | Status: SHIPPED | OUTPATIENT
Start: 2022-06-30 | End: 2022-08-20 | Stop reason: SDUPTHER

## 2022-07-03 DIAGNOSIS — M54.12 CERVICAL RADICULOPATHY: ICD-10-CM

## 2022-07-05 ENCOUNTER — TELEPHONE (OUTPATIENT)
Dept: FAMILY MEDICINE | Facility: HOSPITAL | Age: 47
End: 2022-07-05
Payer: MEDICAID

## 2022-07-05 RX ORDER — PREGABALIN 75 MG/1
75 CAPSULE ORAL 2 TIMES DAILY
Qty: 60 CAPSULE | Refills: 5 | Status: SHIPPED | OUTPATIENT
Start: 2022-07-05 | End: 2023-04-26 | Stop reason: SDUPTHER

## 2022-07-05 NOTE — TELEPHONE ENCOUNTER
----- Message from Kathryn Caicedo MD sent at 7/5/2022  8:24 AM CDT -----  This patient needs to be scheduled to see Dr. Rodriguez in the next couple of months.     Thanks!    Kathryn

## 2022-07-05 NOTE — TELEPHONE ENCOUNTER
Left voice message advising patient to call and schedule an appointment with Dr. Rodriguez in a few months.

## 2022-07-05 NOTE — TELEPHONE ENCOUNTER
-Aware reviewed. No irregularities noted. Patient not taking medication more than prescribed. Patient will need appointment before further refills.     Kathryn Caicedo MD    [facility]  07/05/2022

## 2022-08-15 ENCOUNTER — PATIENT MESSAGE (OUTPATIENT)
Dept: FAMILY MEDICINE | Facility: HOSPITAL | Age: 47
End: 2022-08-15
Payer: MEDICAID

## 2022-08-15 DIAGNOSIS — Z12.39 ENCOUNTER FOR SCREENING FOR MALIGNANT NEOPLASM OF BREAST, UNSPECIFIED SCREENING MODALITY: Primary | ICD-10-CM

## 2022-08-20 ENCOUNTER — PATIENT MESSAGE (OUTPATIENT)
Dept: FAMILY MEDICINE | Facility: HOSPITAL | Age: 47
End: 2022-08-20
Payer: MEDICAID

## 2022-08-20 DIAGNOSIS — J42 CHRONIC BRONCHITIS, UNSPECIFIED CHRONIC BRONCHITIS TYPE: ICD-10-CM

## 2022-08-20 DIAGNOSIS — Z12.31 ENCOUNTER FOR SCREENING MAMMOGRAM FOR MALIGNANT NEOPLASM OF BREAST: ICD-10-CM

## 2022-08-20 RX ORDER — ALBUTEROL SULFATE 90 UG/1
2 AEROSOL, METERED RESPIRATORY (INHALATION) EVERY 6 HOURS PRN
Qty: 18 G | Refills: 3 | Status: SHIPPED | OUTPATIENT
Start: 2022-08-20 | End: 2022-12-05 | Stop reason: SDUPTHER

## 2022-08-20 RX ORDER — ALBUTEROL SULFATE 0.83 MG/ML
2.5 SOLUTION RESPIRATORY (INHALATION) EVERY 6 HOURS PRN
Qty: 90 ML | Refills: 2 | Status: SHIPPED | OUTPATIENT
Start: 2022-08-20 | End: 2023-05-11 | Stop reason: SDUPTHER

## 2022-08-20 NOTE — PROGRESS NOTES
Called patient to inform her that orders have been placed to refill medication and schedule a mammogram. Patient did not answer phone voicemail left.    Antwan Rodriguez MD  [facility] PGY-2  08/20/2022

## 2022-08-22 DIAGNOSIS — M54.12 CERVICAL RADICULOPATHY: ICD-10-CM

## 2022-08-22 RX ORDER — DULOXETIN HYDROCHLORIDE 60 MG/1
60 CAPSULE, DELAYED RELEASE ORAL DAILY
Qty: 90 CAPSULE | Refills: 3 | Status: SHIPPED | OUTPATIENT
Start: 2022-08-22 | End: 2023-08-08 | Stop reason: SDUPTHER

## 2022-08-23 ENCOUNTER — TELEPHONE (OUTPATIENT)
Dept: FAMILY MEDICINE | Facility: HOSPITAL | Age: 47
End: 2022-08-23
Payer: MEDICAID

## 2022-08-23 NOTE — TELEPHONE ENCOUNTER
Called patient and left message stating that all refills requested have been filed. Also stated that a call has been placed to her pharmacy's voicemail requesting the filling of all prescribed medication.

## 2022-09-08 DIAGNOSIS — K58.0 IRRITABLE BOWEL SYNDROME WITH DIARRHEA: ICD-10-CM

## 2022-09-08 RX ORDER — DICYCLOMINE HYDROCHLORIDE 10 MG/1
20 CAPSULE ORAL 3 TIMES DAILY
Qty: 90 CAPSULE | Refills: 2 | Status: SHIPPED | OUTPATIENT
Start: 2022-09-08 | End: 2023-02-14

## 2022-09-14 ENCOUNTER — HOSPITAL ENCOUNTER (OUTPATIENT)
Dept: RADIOLOGY | Facility: HOSPITAL | Age: 47
Discharge: HOME | End: 2022-09-14
Attending: STUDENT IN AN ORGANIZED HEALTH CARE EDUCATION/TRAINING PROGRAM
Payer: MEDICAID

## 2022-09-14 DIAGNOSIS — Z12.31 ENCOUNTER FOR SCREENING MAMMOGRAM FOR MALIGNANT NEOPLASM OF BREAST: ICD-10-CM

## 2022-09-14 PROCEDURE — 77067 MAMMO DIGITAL SCREENING BILAT WITH TOMO: ICD-10-PCS | Mod: 26,,, | Performed by: RADIOLOGY

## 2022-09-14 PROCEDURE — 77063 BREAST TOMOSYNTHESIS BI: CPT | Mod: 26,,, | Performed by: RADIOLOGY

## 2022-09-14 PROCEDURE — 77063 MAMMO DIGITAL SCREENING BILAT WITH TOMO: ICD-10-PCS | Mod: 26,,, | Performed by: RADIOLOGY

## 2022-09-14 PROCEDURE — 77067 SCR MAMMO BI INCL CAD: CPT | Mod: TC

## 2022-09-14 PROCEDURE — 77067 SCR MAMMO BI INCL CAD: CPT | Mod: 26,,, | Performed by: RADIOLOGY

## 2022-10-10 DIAGNOSIS — K58.0 IRRITABLE BOWEL SYNDROME WITH DIARRHEA: ICD-10-CM

## 2022-10-12 RX ORDER — DICYCLOMINE HYDROCHLORIDE 10 MG/1
20 CAPSULE ORAL 3 TIMES DAILY
Qty: 90 CAPSULE | Refills: 2 | Status: SHIPPED | OUTPATIENT
Start: 2022-10-12 | End: 2023-02-14

## 2022-10-18 ENCOUNTER — HOSPITAL ENCOUNTER (OUTPATIENT)
Dept: RADIOLOGY | Facility: HOSPITAL | Age: 47
Discharge: HOME | End: 2022-10-18
Attending: STUDENT IN AN ORGANIZED HEALTH CARE EDUCATION/TRAINING PROGRAM
Payer: MEDICAID

## 2022-10-18 DIAGNOSIS — R92.8 ABNORMAL MAMMOGRAM: ICD-10-CM

## 2022-10-18 PROCEDURE — 76642 US BREAST LEFT LIMITED: ICD-10-PCS | Mod: 26,LT,, | Performed by: RADIOLOGY

## 2022-10-18 PROCEDURE — 77065 DX MAMMO INCL CAD UNI: CPT | Mod: TC,LT

## 2022-10-18 PROCEDURE — 77065 MAMMO DIGITAL DIAGNOSTIC LEFT WITH TOMO: ICD-10-PCS | Mod: 26,LT,, | Performed by: RADIOLOGY

## 2022-10-18 PROCEDURE — 76642 ULTRASOUND BREAST LIMITED: CPT | Mod: TC,LT

## 2022-10-18 PROCEDURE — 77061 MAMMO DIGITAL DIAGNOSTIC LEFT WITH TOMO: ICD-10-PCS | Mod: 26,LT,, | Performed by: RADIOLOGY

## 2022-10-18 PROCEDURE — 77061 BREAST TOMOSYNTHESIS UNI: CPT | Mod: 26,LT,, | Performed by: RADIOLOGY

## 2022-10-18 PROCEDURE — 76642 ULTRASOUND BREAST LIMITED: CPT | Mod: 26,LT,, | Performed by: RADIOLOGY

## 2022-10-18 PROCEDURE — 77065 DX MAMMO INCL CAD UNI: CPT | Mod: 26,LT,, | Performed by: RADIOLOGY

## 2022-10-21 ENCOUNTER — HOSPITAL ENCOUNTER (OUTPATIENT)
Dept: RADIOLOGY | Facility: HOSPITAL | Age: 47
Discharge: HOME | End: 2022-10-21
Attending: STUDENT IN AN ORGANIZED HEALTH CARE EDUCATION/TRAINING PROGRAM
Payer: MEDICAID

## 2022-10-21 DIAGNOSIS — R92.8 ABNORMAL MAMMOGRAM: ICD-10-CM

## 2022-10-21 PROCEDURE — 88305 TISSUE EXAM BY PATHOLOGIST: CPT | Performed by: PATHOLOGY

## 2022-10-21 PROCEDURE — 19083 BX BREAST 1ST LESION US IMAG: CPT | Mod: LT,,, | Performed by: RADIOLOGY

## 2022-10-21 PROCEDURE — 19083 US BREAST BIOPSY WITH IMAGING 1ST SITE LEFT: ICD-10-PCS | Mod: LT,,, | Performed by: RADIOLOGY

## 2022-10-21 PROCEDURE — A4550 SURGICAL TRAYS: HCPCS

## 2022-10-21 PROCEDURE — 88305 TISSUE EXAM BY PATHOLOGIST: CPT | Mod: 26,,, | Performed by: PATHOLOGY

## 2022-10-21 PROCEDURE — 77065 DX MAMMO INCL CAD UNI: CPT | Mod: TC,LT

## 2022-10-21 PROCEDURE — 77065 DX MAMMO INCL CAD UNI: CPT | Mod: 26,LT,, | Performed by: RADIOLOGY

## 2022-10-21 PROCEDURE — 88305 TISSUE EXAM BY PATHOLOGIST: ICD-10-PCS | Mod: 26,,, | Performed by: PATHOLOGY

## 2022-10-21 PROCEDURE — 77065 MAMMO DIGITAL DIAGNOSTIC LEFT: ICD-10-PCS | Mod: 26,LT,, | Performed by: RADIOLOGY

## 2022-10-24 LAB
FINAL PATHOLOGIC DIAGNOSIS: NORMAL
GROSS: NORMAL
Lab: NORMAL

## 2022-12-01 ENCOUNTER — PATIENT MESSAGE (OUTPATIENT)
Dept: FAMILY MEDICINE | Facility: HOSPITAL | Age: 47
End: 2022-12-01
Payer: MEDICAID

## 2022-12-05 DIAGNOSIS — J42 CHRONIC BRONCHITIS, UNSPECIFIED CHRONIC BRONCHITIS TYPE: ICD-10-CM

## 2022-12-06 RX ORDER — ALBUTEROL SULFATE 90 UG/1
2 AEROSOL, METERED RESPIRATORY (INHALATION) EVERY 6 HOURS PRN
Qty: 18 G | Refills: 3 | Status: SHIPPED | OUTPATIENT
Start: 2022-12-06 | End: 2023-02-20 | Stop reason: SDUPTHER

## 2022-12-21 ENCOUNTER — TELEPHONE (OUTPATIENT)
Dept: FAMILY MEDICINE | Facility: HOSPITAL | Age: 47
End: 2022-12-21
Payer: MEDICAID

## 2022-12-21 NOTE — TELEPHONE ENCOUNTER
Called patient left message. Patient prescription was placed and filled on 12/7/22. Advised patient to call clinic if she still is unable to get her medications.

## 2023-02-14 DIAGNOSIS — K58.0 IRRITABLE BOWEL SYNDROME WITH DIARRHEA: Primary | ICD-10-CM

## 2023-02-14 RX ORDER — DICYCLOMINE HYDROCHLORIDE 20 MG/1
20 TABLET ORAL 3 TIMES DAILY
Qty: 90 TABLET | Refills: 0 | Status: SHIPPED | OUTPATIENT
Start: 2023-02-14 | End: 2023-03-17

## 2023-02-20 DIAGNOSIS — J42 CHRONIC BRONCHITIS, UNSPECIFIED CHRONIC BRONCHITIS TYPE: ICD-10-CM

## 2023-02-22 RX ORDER — ALBUTEROL SULFATE 90 UG/1
2 AEROSOL, METERED RESPIRATORY (INHALATION) EVERY 6 HOURS PRN
Qty: 18 G | Refills: 3 | Status: SHIPPED | OUTPATIENT
Start: 2023-02-22 | End: 2023-04-26 | Stop reason: SDUPTHER

## 2023-03-29 DIAGNOSIS — M54.12 CERVICAL RADICULOPATHY: ICD-10-CM

## 2023-03-29 RX ORDER — PREGABALIN 75 MG/1
75 CAPSULE ORAL 2 TIMES DAILY
Qty: 60 CAPSULE | Refills: 5 | Status: CANCELLED | OUTPATIENT
Start: 2023-03-29 | End: 2023-09-25

## 2023-04-26 ENCOUNTER — OFFICE VISIT (OUTPATIENT)
Dept: FAMILY MEDICINE | Facility: HOSPITAL | Age: 48
End: 2023-04-26
Payer: MEDICAID

## 2023-04-26 VITALS
DIASTOLIC BLOOD PRESSURE: 76 MMHG | BODY MASS INDEX: 31.83 KG/M2 | WEIGHT: 202.81 LBS | HEIGHT: 67 IN | HEART RATE: 78 BPM | SYSTOLIC BLOOD PRESSURE: 131 MMHG

## 2023-04-26 DIAGNOSIS — J42 CHRONIC BRONCHITIS, UNSPECIFIED CHRONIC BRONCHITIS TYPE: Primary | ICD-10-CM

## 2023-04-26 DIAGNOSIS — M54.12 CERVICAL RADICULOPATHY: ICD-10-CM

## 2023-04-26 DIAGNOSIS — Z72.0 TOBACCO USE: ICD-10-CM

## 2023-04-26 DIAGNOSIS — Z11.59 ENCOUNTER FOR HCV SCREENING TEST FOR LOW RISK PATIENT: ICD-10-CM

## 2023-04-26 DIAGNOSIS — K58.0 IRRITABLE BOWEL SYNDROME WITH DIARRHEA: ICD-10-CM

## 2023-04-26 DIAGNOSIS — Z12.11 SCREENING FOR MALIGNANT NEOPLASM OF COLON: ICD-10-CM

## 2023-04-26 DIAGNOSIS — E66.9 CLASS 1 OBESITY WITH BODY MASS INDEX (BMI) OF 31.0 TO 31.9 IN ADULT, UNSPECIFIED OBESITY TYPE, UNSPECIFIED WHETHER SERIOUS COMORBIDITY PRESENT: ICD-10-CM

## 2023-04-26 PROCEDURE — 99214 OFFICE O/P EST MOD 30 MIN: CPT | Performed by: STUDENT IN AN ORGANIZED HEALTH CARE EDUCATION/TRAINING PROGRAM

## 2023-04-26 RX ORDER — DICYCLOMINE HYDROCHLORIDE 10 MG/1
10 CAPSULE ORAL
Qty: 120 CAPSULE | Refills: 0 | Status: SHIPPED | OUTPATIENT
Start: 2023-04-26 | End: 2023-07-10 | Stop reason: SDUPTHER

## 2023-04-26 RX ORDER — ONDANSETRON 4 MG/1
4 TABLET, FILM COATED ORAL 2 TIMES DAILY
Qty: 30 TABLET | Refills: 2 | Status: SHIPPED | OUTPATIENT
Start: 2023-04-26 | End: 2023-07-28

## 2023-04-26 RX ORDER — ALBUTEROL SULFATE 90 UG/1
2 AEROSOL, METERED RESPIRATORY (INHALATION) EVERY 6 HOURS PRN
Qty: 18 G | Refills: 3 | Status: SHIPPED | OUTPATIENT
Start: 2023-04-26 | End: 2023-07-10 | Stop reason: SDUPTHER

## 2023-04-26 RX ORDER — PREGABALIN 75 MG/1
75 CAPSULE ORAL 2 TIMES DAILY
Qty: 60 CAPSULE | Refills: 5 | Status: SHIPPED | OUTPATIENT
Start: 2023-04-26 | End: 2024-02-20 | Stop reason: SDUPTHER

## 2023-05-04 NOTE — PROGRESS NOTES
[facility]  History & Physical    SUBJECTIVE:     Chief Complaint:   Chief Complaint   Patient presents with    Knee Pain       History of Present Illness:  48 y.o. female who  has a past medical history of Bronchitis. presents to clinic today for knee pain and follow up related to asthma, and chronic neck pain. Patient has history of chronic back pain with sciatica controlled with lyrica. Patient also needs refills of medications and health maintenance screening. Patient reports worsening knee pain that is worse when she is working. Patients job requires repetitive bending over and crouch to move objects.       Allergies:  Review of patient's allergies indicates:   Allergen Reactions    Amoxicillin Hives and Itching    Penicillins Other (See Comments)     Patient states she stops breathing    Naproxen Nausea Only       Home Medications:  Current Outpatient Medications on File Prior to Visit   Medication Sig    albuterol (PROVENTIL) 2.5 mg /3 mL (0.083 %) nebulizer solution Inhale contents of 1 vial (3 mLs)  by nebulization every 6 (six) hours as needed for Wheezing or Shortness of Breath. Rescue    DULoxetine (CYMBALTA) 60 MG capsule Take 1 capsule (60 mg total) by mouth once daily.    meclizine (ANTIVERT) 12.5 mg tablet Take 1 tablet (12.5 mg total) by mouth 3 (three) times daily as needed.    ondansetron (ZOFRAN-ODT) 4 MG TbDL DIS ONE T PO TID    famotidine (PEPCID) 20 MG tablet Take 1 tablet (20 mg total) by mouth 2 (two) times daily.    loperamide (IMODIUM) 2 mg capsule Take 1 capsule (2 mg total) by mouth 4 (four) times daily as needed for Diarrhea. (Patient not taking: Reported on 4/26/2023)     No current facility-administered medications on file prior to visit.       Past Medical History:   Diagnosis Date    Bronchitis      No past surgical history on file.  Family History   Problem Relation Age of Onset    Breast cancer Maternal Aunt     COPD Mother     Emphysema Mother     Depression Mother      Coronary artery disease Mother     No Known Problems Father      Social History     Tobacco Use    Smoking status: Every Day     Packs/day: 1.00     Types: Cigarettes    Smokeless tobacco: Never   Substance Use Topics    Alcohol use: No    Drug use: Yes     Types: Marijuana        ROS     OBJECTIVE:     Vital Signs:  Pulse: 78 (04/26/23 1108)  BP: 131/76 (04/26/23 1108)  Body mass index is 31.77 kg/m².  Physical Exam    Laboratory:  Hemoglobin A1C   Date Value Ref Range Status   04/26/2023 5.1 4.0 - 5.6 % Final     Comment:     ADA Screening Guidelines:  5.7-6.4%  Consistent with prediabetes  >or=6.5%  Consistent with diabetes    High levels of fetal hemoglobin interfere with the HbA1C  assay. Heterozygous hemoglobin variants (HbS, HgC, etc)do  not significantly interfere with this assay.   However, presence of multiple variants may affect accuracy.     07/13/2018 5.1 4.0 - 5.6 % Final     Comment:     ADA Screening Guidelines:  5.7-6.4%  Consistent with prediabetes  >or=6.5%  Consistent with diabetes  High levels of fetal hemoglobin interfere with the HbA1C  assay. Heterozygous hemoglobin variants (HbS, HgC, etc)do  not significantly interfere with this assay.   However, presence of multiple variants may affect accuracy.         A/P:  Brissa was seen today for knee pain. Suspect knee pain is related to repetitive stress from work. Recommend patient wear sleeve knee braces at work. Recommend starting knee strengthening exercises and possibly incorporating daily prasanna chi into her routine. All medications refilled. Patient as no documented PFTs, so orders have been placed for PFT. Health maintenance lab work ordered. Declines all vaccines at this time. Patient needs a pap smear, she will schedule an appointment for a well woman's visit with pap smear. Screening Colonoscopy ordered      Diagnoses and all orders for this visit:    Chronic bronchitis, unspecified chronic bronchitis type  -     NEBULIZER FOR HOME USE  -      Complete PFT w/ bronchodilator; Future  -     albuterol (PROAIR HFA) 90 mcg/actuation inhaler; Inhale 2 puffs into the lungs every 6 (six) hours as needed for Wheezing. Rescue    Tobacco use  -     NEBULIZER FOR HOME USE  -     Complete PFT w/ bronchodilator; Future  -     albuterol (PROAIR HFA) 90 mcg/actuation inhaler; Inhale 2 puffs into the lungs every 6 (six) hours as needed for Wheezing. Rescue    Irritable bowel syndrome with diarrhea  -     ondansetron (ZOFRAN) 4 MG tablet; Take 1 tablet (4 mg total) by mouth 2 (two) times daily.  -     dicyclomine (BENTYL) 10 MG capsule; Take 1 capsule (10 mg total) by mouth 4 (four) times daily before meals and nightly.    Screening for malignant neoplasm of colon  -     Case Request Endoscopy: COLONOSCOPY    Class 1 obesity with body mass index (BMI) of 31.0 to 31.9 in adult, unspecified obesity type, unspecified whether serious comorbidity present  -     Hemoglobin A1C; Future    Encounter for HCV screening test for low risk patient  -     Hepatitis C Antibody; Future    Cervical radiculopathy  -     pregabalin (LYRICA) 75 MG capsule; Take 1 capsule (75 mg total) by mouth 2 (two) times daily.        Follow up in about 4 weeks (around 5/24/2023) for Women's health with pap smear.      Antwan Rodriguez MD  [facility] PGY-2  05/04/2023

## 2023-05-09 DIAGNOSIS — J42 CHRONIC BRONCHITIS, UNSPECIFIED CHRONIC BRONCHITIS TYPE: ICD-10-CM

## 2023-05-09 RX ORDER — ALBUTEROL SULFATE 0.83 MG/ML
2.5 SOLUTION RESPIRATORY (INHALATION) EVERY 6 HOURS PRN
Qty: 90 ML | Refills: 2 | OUTPATIENT
Start: 2023-05-09 | End: 2024-05-08

## 2023-05-10 RX ORDER — ALBUTEROL SULFATE 0.83 MG/ML
2.5 SOLUTION RESPIRATORY (INHALATION) EVERY 6 HOURS PRN
Qty: 90 ML | Refills: 2 | OUTPATIENT
Start: 2023-05-10 | End: 2024-05-09

## 2023-05-11 DIAGNOSIS — J42 CHRONIC BRONCHITIS, UNSPECIFIED CHRONIC BRONCHITIS TYPE: ICD-10-CM

## 2023-05-14 RX ORDER — ALBUTEROL SULFATE 0.83 MG/ML
2.5 SOLUTION RESPIRATORY (INHALATION) EVERY 6 HOURS PRN
Qty: 90 ML | Refills: 2 | Status: SHIPPED | OUTPATIENT
Start: 2023-05-14 | End: 2023-09-18 | Stop reason: SDUPTHER

## 2023-05-17 ENCOUNTER — PATIENT MESSAGE (OUTPATIENT)
Dept: FAMILY MEDICINE | Facility: HOSPITAL | Age: 48
End: 2023-05-17
Payer: MEDICAID

## 2023-05-25 NOTE — PROGRESS NOTES
I assume primary medical responsibility for this patient. I have reviewed the history, physical, and assessement & treatment plan with the resident and agree that the care is reasonable and necessary. This service has been performed by a resident without the presence of a teaching physician under the primary care exception. If necessary, an addendum of additional findings or evaluation beyond the resident documentation will be noted below.     Avis Castaneda MD

## 2023-05-30 ENCOUNTER — HOSPITAL ENCOUNTER (OUTPATIENT)
Dept: RADIOLOGY | Facility: HOSPITAL | Age: 48
Discharge: HOME | End: 2023-05-30
Attending: STUDENT IN AN ORGANIZED HEALTH CARE EDUCATION/TRAINING PROGRAM
Payer: MEDICAID

## 2023-05-30 DIAGNOSIS — R92.8 ABNORMAL MAMMOGRAM: ICD-10-CM

## 2023-05-30 PROCEDURE — 77061 BREAST TOMOSYNTHESIS UNI: CPT | Mod: TC,LT

## 2023-05-30 PROCEDURE — 77065 DX MAMMO INCL CAD UNI: CPT | Mod: 26,LT,, | Performed by: RADIOLOGY

## 2023-05-30 PROCEDURE — 77061 BREAST TOMOSYNTHESIS UNI: CPT | Mod: 26,LT,, | Performed by: RADIOLOGY

## 2023-05-30 PROCEDURE — 77061 MAMMO DIGITAL DIAGNOSTIC LEFT WITH TOMO: ICD-10-PCS | Mod: 26,LT,, | Performed by: RADIOLOGY

## 2023-05-30 PROCEDURE — 77065 MAMMO DIGITAL DIAGNOSTIC LEFT WITH TOMO: ICD-10-PCS | Mod: 26,LT,, | Performed by: RADIOLOGY

## 2023-07-09 ENCOUNTER — PATIENT MESSAGE (OUTPATIENT)
Dept: FAMILY MEDICINE | Facility: HOSPITAL | Age: 48
End: 2023-07-09
Payer: MEDICAID

## 2023-07-10 DIAGNOSIS — K58.0 IRRITABLE BOWEL SYNDROME WITH DIARRHEA: ICD-10-CM

## 2023-07-10 DIAGNOSIS — J42 CHRONIC BRONCHITIS, UNSPECIFIED CHRONIC BRONCHITIS TYPE: ICD-10-CM

## 2023-07-10 DIAGNOSIS — Z72.0 TOBACCO USE: ICD-10-CM

## 2023-07-10 RX ORDER — ALBUTEROL SULFATE 90 UG/1
2 AEROSOL, METERED RESPIRATORY (INHALATION) EVERY 6 HOURS PRN
Qty: 18 G | Refills: 0 | Status: SHIPPED | OUTPATIENT
Start: 2023-07-10 | End: 2023-07-30 | Stop reason: SDUPTHER

## 2023-07-10 RX ORDER — DICYCLOMINE HYDROCHLORIDE 10 MG/1
10 CAPSULE ORAL
Qty: 120 CAPSULE | Refills: 0 | Status: SHIPPED | OUTPATIENT
Start: 2023-07-10 | End: 2023-07-30 | Stop reason: SDUPTHER

## 2023-07-26 ENCOUNTER — OFFICE VISIT (OUTPATIENT)
Dept: FAMILY MEDICINE | Facility: HOSPITAL | Age: 48
End: 2023-07-26
Payer: MEDICAID

## 2023-07-26 VITALS
HEART RATE: 92 BPM | HEIGHT: 67 IN | DIASTOLIC BLOOD PRESSURE: 78 MMHG | BODY MASS INDEX: 30.66 KG/M2 | WEIGHT: 195.38 LBS | SYSTOLIC BLOOD PRESSURE: 116 MMHG

## 2023-07-26 DIAGNOSIS — S52.352A DISPLACED COMMINUTED FRACTURE OF SHAFT OF RADIUS, LEFT ARM, INITIAL ENCOUNTER FOR CLOSED FRACTURE: Primary | ICD-10-CM

## 2023-07-26 PROCEDURE — 99215 OFFICE O/P EST HI 40 MIN: CPT | Performed by: STUDENT IN AN ORGANIZED HEALTH CARE EDUCATION/TRAINING PROGRAM

## 2023-07-26 RX ORDER — TRAMADOL HYDROCHLORIDE 50 MG/1
50 TABLET ORAL 3 TIMES DAILY PRN
Qty: 21 EACH | Refills: 0 | Status: SHIPPED | OUTPATIENT
Start: 2023-07-26 | End: 2023-08-02

## 2023-07-26 NOTE — PROGRESS NOTES
[facility]  History & Physical    SUBJECTIVE:     Chief Complaint:   No chief complaint on file.      History of Present Illness:  48 y.o. female who  has a past medical history of Bronchitis. presents to clinic today for Acute fracture of left wrist. Patient was evaluated for a fracture of the left wrist/forearm on 7/14/23 at  emergency room. Patient arm was splinted, wrapped and placed in a sling. Follow up appointment was with and orthopedic surgeon that does not take patients insurance. Patient continues to have severe left arm pain. Patient reports some numbness of the left thumb.       Allergies:  Review of patient's allergies indicates:   Allergen Reactions    Amoxicillin Hives and Itching    Penicillins Other (See Comments)     Patient states she stops breathing    Naproxen Nausea Only       Home Medications:  Current Outpatient Medications on File Prior to Visit   Medication Sig    albuterol (PROAIR HFA) 90 mcg/actuation inhaler Inhale 2 puffs into the lungs every 6 (six) hours as needed for Wheezing. Rescue    albuterol (PROVENTIL) 2.5 mg /3 mL (0.083 %) nebulizer solution Inhale contents of 1 vial (3 mLs)  by nebulization every 6 (six) hours as needed for Wheezing or Shortness of Breath. Rescue    dicyclomine (BENTYL) 10 MG capsule Take 1 capsule (10 mg total) by mouth 4 (four) times daily before meals and nightly.    DULoxetine (CYMBALTA) 60 MG capsule Take 1 capsule (60 mg total) by mouth once daily.    ondansetron (ZOFRAN-ODT) 4 MG TbDL DIS ONE T PO TID    pregabalin (LYRICA) 75 MG capsule Take 1 capsule (75 mg total) by mouth 2 (two) times daily.    loperamide (IMODIUM) 2 mg capsule Take 1 capsule (2 mg total) by mouth 4 (four) times daily as needed for Diarrhea. (Patient not taking: Reported on 4/26/2023)    ondansetron (ZOFRAN) 4 MG tablet Take 1 tablet (4 mg total) by mouth 2 (two) times daily.    [DISCONTINUED] famotidine (PEPCID) 20 MG tablet Take 1 tablet (20 mg total) by mouth 2  (two) times daily.    [DISCONTINUED] meclizine (ANTIVERT) 12.5 mg tablet Take 1 tablet (12.5 mg total) by mouth 3 (three) times daily as needed.     No current facility-administered medications on file prior to visit.       Past Medical History:   Diagnosis Date    Bronchitis      No past surgical history on file.  Family History   Problem Relation Age of Onset    Breast cancer Maternal Aunt     COPD Mother     Emphysema Mother     Depression Mother     Coronary artery disease Mother     No Known Problems Father      Social History     Tobacco Use    Smoking status: Every Day     Packs/day: 1.00     Types: Cigarettes    Smokeless tobacco: Never   Substance Use Topics    Alcohol use: No    Drug use: Yes     Types: Marijuana        Review of Systems   Constitutional:  Negative for fever and weight loss.   HENT:  Negative for hearing loss and sore throat.    Eyes:  Negative for blurred vision.   Respiratory:  Negative for cough, shortness of breath and wheezing.    Cardiovascular:  Negative for chest pain and leg swelling.   Gastrointestinal:  Negative for heartburn, nausea and vomiting.   Genitourinary:  Negative for dysuria.   Musculoskeletal:  Positive for joint pain and myalgias. Negative for back pain.        Severe pain in left arm    Neurological:  Negative for dizziness, weakness and headaches.      OBJECTIVE:     Vital Signs:  Pulse: 92 (07/26/23 1440)  BP: 116/78 (07/26/23 1440)  Body mass index is 30.6 kg/m².  Physical Exam  HENT:      Head: Normocephalic.      Right Ear: External ear normal.      Left Ear: External ear normal.      Nose: Nose normal.   Cardiovascular:      Rate and Rhythm: Normal rate.   Pulmonary:      Effort: Pulmonary effort is normal.   Abdominal:      Palpations: Abdomen is soft.   Musculoskeletal:         General: Signs of injury present.      Cervical back: Normal range of motion.      Comments: Left arm is in a splint and sling  Some numbness present over left thumb.   Distal pulses  and capillary refill normal in left hand   Skin:     Capillary Refill: Capillary refill takes less than 2 seconds.   Neurological:      General: No focal deficit present.      Mental Status: She is alert and oriented to person, place, and time.   Psychiatric:         Mood and Affect: Mood normal.       Laboratory:  Hemoglobin A1C   Date Value Ref Range Status   04/26/2023 5.1 4.0 - 5.6 % Final     Comment:     ADA Screening Guidelines:  5.7-6.4%  Consistent with prediabetes  >or=6.5%  Consistent with diabetes    High levels of fetal hemoglobin interfere with the HbA1C  assay. Heterozygous hemoglobin variants (HbS, HgC, etc)do  not significantly interfere with this assay.   However, presence of multiple variants may affect accuracy.     07/13/2018 5.1 4.0 - 5.6 % Final     Comment:     ADA Screening Guidelines:  5.7-6.4%  Consistent with prediabetes  >or=6.5%  Consistent with diabetes  High levels of fetal hemoglobin interfere with the HbA1C  assay. Heterozygous hemoglobin variants (HbS, HgC, etc)do  not significantly interfere with this assay.   However, presence of multiple variants may affect accuracy.         A/P:  Diagnoses and all orders for this visit: Patient has acute fracture in need of evaluation by Orthopedic surgeon. Referral placed to Dr. Greer. Direct message sent to Dr Greer through epic to discuss patient. Tramadol provided for breakthrough pain.     Displaced comminuted fracture of shaft of radius, left arm, initial encounter for closed fracture  -     Ambulatory referral/consult to Orthopedics; Future  -     traMADoL (ULTRAM) 50 mg tablet; Take 1 tablet (50 mg total) by mouth 3 (three) times daily as needed for Pain.        Follow up in about 1 month (around 8/26/2023).      Antwan Rodriguez MD  [facility] PGY-3  07/26/2023

## 2023-07-28 ENCOUNTER — HOSPITAL ENCOUNTER (EMERGENCY)
Facility: HOSPITAL | Age: 48
Discharge: HOME | End: 2023-07-28
Attending: EMERGENCY MEDICINE
Payer: MEDICAID

## 2023-07-28 VITALS
TEMPERATURE: 98 F | OXYGEN SATURATION: 98 % | BODY MASS INDEX: 30.61 KG/M2 | HEART RATE: 78 BPM | HEIGHT: 67 IN | SYSTOLIC BLOOD PRESSURE: 124 MMHG | WEIGHT: 195 LBS | DIASTOLIC BLOOD PRESSURE: 70 MMHG | RESPIRATION RATE: 16 BRPM

## 2023-07-28 DIAGNOSIS — T14.90XA INJURY: ICD-10-CM

## 2023-07-28 DIAGNOSIS — S62.102A LEFT WRIST FRACTURE, CLOSED, INITIAL ENCOUNTER: Primary | ICD-10-CM

## 2023-07-28 PROCEDURE — 99283 EMERGENCY DEPT VISIT LOW MDM: CPT | Mod: ER

## 2023-07-28 PROCEDURE — 29105 APPLICATION LONG ARM SPLINT: CPT | Mod: LT,ER

## 2023-07-28 NOTE — DISCHARGE INSTRUCTIONS
§ Please return to the Emergency Department for any new or worsening symptoms including: fever, chest pain, shortness of breath, loss of consciousness, dizziness, weakness, or any other concerns.     § Schedule an appointment for follow up with Orthopedics as soon as possible for a recheck of your symptoms. If you do not have one, contact the one listed on your discharge paperwork or call the [facility] Appointment Desk at [phone] to schedule an appointment.     § If you require follow up care from a specialist and are unable to schedule an appointment with them directly, please contact your Primary Care Provider on the next business day to set up a referral.      § Please take all medication as prescribed.

## 2023-07-28 NOTE — ED PROVIDER NOTES
Encounter Date: 7/28/2023       History     Chief Complaint   Patient presents with    Arm Injury     Reports fx to left arm on 7/14, seen at [facility] for same, states cast is painful     48 y.o. female with a PMH of Breast cancer, COPD, emphysema, CAD, depression who presents to the Emergency Department with complaints of left wrist pain s/p fracture on 7/14/2023.  She states that there is not enough padding and that the splint material is hurting her in several different places.  Patient has been unable to f/u with orthopedic due to insurance coverage.  She was seen recently by her PCP and had another referral placed and was given an RX for Ultram.  She denies rash, fever, chest pain, SOB, numbness, weakness, tingling, abdominal pain, back pain, dysuria, hematuria, nausea, vomiting, diarrhea, or any other complaints.   She rates the pain as 6/10 and taken her RX medications for the symptoms.  No Alleviating/aggravating factors.                The history is provided by the patient.   Review of patient's allergies indicates:   Allergen Reactions    Amoxicillin Hives and Itching    Penicillins Other (See Comments)     Patient states she stops breathing    Naproxen Nausea Only     Past Medical History:   Diagnosis Date    Bronchitis      History reviewed. No pertinent surgical history.  Family History   Problem Relation Age of Onset    Breast cancer Maternal Aunt     COPD Mother     Emphysema Mother     Depression Mother     Coronary artery disease Mother     No Known Problems Father      Social History     Tobacco Use    Smoking status: Every Day     Packs/day: 1.00     Types: Cigarettes    Smokeless tobacco: Never   Substance Use Topics    Alcohol use: No    Drug use: Yes     Types: Marijuana     Review of Systems   Constitutional:  Negative for chills and fever.   HENT:  Negative for congestion, ear pain, rhinorrhea, sore throat and trouble swallowing.    Eyes:  Negative for pain, discharge and redness.    Respiratory:  Negative for cough and shortness of breath.    Cardiovascular:  Negative for chest pain.   Gastrointestinal:  Negative for abdominal pain, diarrhea, nausea and vomiting.   Genitourinary:  Negative for decreased urine volume and dysuria.   Musculoskeletal:  Positive for arthralgias. Negative for back pain, neck pain and neck stiffness.   Skin:  Negative for rash.   Neurological:  Negative for dizziness, weakness, light-headedness, numbness and headaches.   Psychiatric/Behavioral:  Negative for confusion.      Physical Exam     Initial Vitals [07/28/23 1030]   BP Pulse Resp Temp SpO2   115/75 81 19 98 °F (36.7 °C) 97 %      MAP       --         Physical Exam    Nursing note and vitals reviewed.  Constitutional: Vital signs are normal. She appears well-developed.  Non-toxic appearance. She does not appear ill.   HENT:   Head: Normocephalic and atraumatic.   Eyes: Conjunctivae are normal.   Neck:   Normal range of motion.  Cardiovascular:  Normal rate and regular rhythm.           Pulmonary/Chest: Effort normal and breath sounds normal. She exhibits no tenderness.   Abdominal: Abdomen is soft. There is no abdominal tenderness.   Musculoskeletal:      Left wrist: Tenderness present. Decreased range of motion.      Cervical back: Normal range of motion.      Comments: Sugartong splint in place to left arm; +2 radial pulse; capillary refill < 2; decreased ROM to left hand due to splint in place; splint removed by me, no erythema, rash, or obvious deformity; normal sensation; minimal bruising noted to 2nd and 3rd MCP     Neurological: She is alert and oriented to person, place, and time. Gait normal. GCS eye subscore is 4. GCS verbal subscore is 5. GCS motor subscore is 6.   Skin: Skin is warm, dry and intact. No rash noted.   Psychiatric: She has a normal mood and affect. Her speech is normal and behavior is normal. Judgment and thought content normal.       ED Course   Orthopedic Injury    Date/Time:  7/28/2023 11:25 AM  Performed by: BAYLEE La  Authorized by: Mahi Mckinley DO     Location procedure was performed:  [facility] EMERGENCY DEPARTMENT  Injury:     Injury location:  Wrist    Location details:  Left wrist    Injury type:  Fracture    Fracture type: distal radius and ulnar styloid      Fracture type: distal radius and ulnar styloid        Pre-procedure assessment:     Neurovascular status: Neurovascularly intact      Distal perfusion: normal      Neurological function: normal      Range of motion: reduced        Selections made in this section will also lock the Injury type section above.:     Manipulation performed?: No      Immobilization:  Splint    Splint type:  Sugar tong    Supplies used:  Ortho-Glass, cotton padding and elastic bandage    Complications: No      Estimated blood loss (mL):  0    Specimens: No      Implants: No    Post-procedure assessment:     Neurovascular status: Neurovascularly intact      Distal perfusion: normal      Neurological function: normal      Range of motion: splinted      Patient tolerance:  Patient tolerated the procedure well with no immediate complications     Splint placed by RN  Labs Reviewed - No data to display       Imaging Results              X-Ray Forearm Left (Final result)  Result time 07/28/23 11:00:53      Final result by Roni Boyd MD (07/28/23 11:00:53)                   Impression:      Fractures of the distal left radius and ulnar styloid process.  Fracture fragments appear in satisfactory position and alignment with circumferential splint material present.      Electronically signed by: Roni Boyd MD  Date:    07/28/2023  Time:    11:00               Narrative:    EXAMINATION:  XR FOREARM LEFT; XR WRIST COMPLETE 3 VIEWS LEFT    CLINICAL HISTORY:  Injury, unspecified, initial encounter    TECHNIQUE:  AP and lateral views of the left forearm and AP, lateral, and oblique radiographs of the left wrist were  performed.    COMPARISON:  None    FINDINGS:  There is a fracture of the distal left radius and there appear to be intra-articular extensions of the fracture lines.  There is also fracture of the ulnar styloid process.  The bones demonstrate satisfactory position and alignment.  Note that there is circumferential splint material present which partially obscures the soft tissue detail.  No other fractures are appreciated.  There is no evidence for dislocation.  Soft tissues are grossly unremarkable.                                       X-Ray Wrist Complete Left (Final result)  Result time 07/28/23 11:00:53      Final result by Roni Boyd MD (07/28/23 11:00:53)                   Impression:      Fractures of the distal left radius and ulnar styloid process.  Fracture fragments appear in satisfactory position and alignment with circumferential splint material present.      Electronically signed by: Roni Boyd MD  Date:    07/28/2023  Time:    11:00               Narrative:    EXAMINATION:  XR FOREARM LEFT; XR WRIST COMPLETE 3 VIEWS LEFT    CLINICAL HISTORY:  Injury, unspecified, initial encounter    TECHNIQUE:  AP and lateral views of the left forearm and AP, lateral, and oblique radiographs of the left wrist were performed.    COMPARISON:  None    FINDINGS:  There is a fracture of the distal left radius and there appear to be intra-articular extensions of the fracture lines.  There is also fracture of the ulnar styloid process.  The bones demonstrate satisfactory position and alignment.  Note that there is circumferential splint material present which partially obscures the soft tissue detail.  No other fractures are appreciated.  There is no evidence for dislocation.  Soft tissues are grossly unremarkable.                                       Medications - No data to display        APC / Resident Notes:   This is an evaluation of a 48 y.o. female that presents to the Emergency Department for left wrist  pain.   The patient is a non-toxic, afebrile, and well appearing female. On physical exam, there is Sugartong splint in place to left arm; +2 radial pulse; capillary refill < 2; decreased ROM to left hand due to splint in place; splint removed by me, no erythema, rash, or obvious deformity; normal sensation; minimal bruising noted to 2nd and 3rd MCP    Vital Signs: 115/75, 98, 81, 19, 97%   If available, previous records reviewed.   I ordered X-rays and reviewed the radiologist interpretation.  Xray significant for fracture of the distal left radius and there appear to be intra-articular extensions of the fracture lines.  There is also fracture of the ulnar styloid process.  The bones demonstrate satisfactory position and alignment.  Note that there is circumferential splint material present which partially obscures the soft tissue detail.  No other fractures are appreciated.  There is no evidence for dislocation.  Soft tissues are grossly unremarkable      My overall impression is Fracture of left radius and ulna.  I considered but doubt dislocation, laceration, cellulitis, septic joint, Gout.    During her stay in the ED, the patient's was replaced with good relief of her symptoms. The patient will be discharged home with continue RX for tramadol. Additional home care recommendations include Tylenol, Hydration. The diagnosis, treatment plan, instructions for follow-up, strict return precautions, and reevaluation with her Ortho-referral placed as well as ED return precautions have been discussed with the patient and she has verbalized an understanding of the information.  All questions or concerns from the patient have been addressed.                      Clinical Impression:   Final diagnoses:  [T14.90XA] Injury               Liz Mitchell, BAYLEE  07/28/23 1212

## 2023-07-30 DIAGNOSIS — Z72.0 TOBACCO USE: ICD-10-CM

## 2023-07-30 DIAGNOSIS — K58.0 IRRITABLE BOWEL SYNDROME WITH DIARRHEA: ICD-10-CM

## 2023-07-30 DIAGNOSIS — J42 CHRONIC BRONCHITIS, UNSPECIFIED CHRONIC BRONCHITIS TYPE: ICD-10-CM

## 2023-08-01 RX ORDER — DICYCLOMINE HYDROCHLORIDE 10 MG/1
10 CAPSULE ORAL
Qty: 120 CAPSULE | Refills: 0 | Status: SHIPPED | OUTPATIENT
Start: 2023-08-01 | End: 2023-08-27 | Stop reason: SDUPTHER

## 2023-08-01 RX ORDER — ALBUTEROL SULFATE 90 UG/1
2 AEROSOL, METERED RESPIRATORY (INHALATION) EVERY 6 HOURS PRN
Qty: 18 G | Refills: 0 | Status: SHIPPED | OUTPATIENT
Start: 2023-08-01 | End: 2023-08-22 | Stop reason: SDUPTHER

## 2023-08-08 DIAGNOSIS — M54.12 CERVICAL RADICULOPATHY: ICD-10-CM

## 2023-08-08 RX ORDER — DULOXETIN HYDROCHLORIDE 60 MG/1
60 CAPSULE, DELAYED RELEASE ORAL DAILY
Qty: 90 CAPSULE | Refills: 3 | Status: SHIPPED | OUTPATIENT
Start: 2023-08-08 | End: 2023-09-19 | Stop reason: SDUPTHER

## 2023-08-15 ENCOUNTER — HOSPITAL ENCOUNTER (OUTPATIENT)
Dept: RADIOLOGY | Facility: HOSPITAL | Age: 48
Discharge: HOME | End: 2023-08-15
Attending: STUDENT IN AN ORGANIZED HEALTH CARE EDUCATION/TRAINING PROGRAM
Payer: MEDICAID

## 2023-08-15 ENCOUNTER — OFFICE VISIT (OUTPATIENT)
Dept: FAMILY MEDICINE | Facility: HOSPITAL | Age: 48
End: 2023-08-15
Payer: MEDICAID

## 2023-08-15 VITALS
DIASTOLIC BLOOD PRESSURE: 72 MMHG | HEART RATE: 85 BPM | SYSTOLIC BLOOD PRESSURE: 107 MMHG | HEIGHT: 67 IN | BODY MASS INDEX: 30.59 KG/M2 | WEIGHT: 194.88 LBS

## 2023-08-15 DIAGNOSIS — S52.502D CLOSED FRACTURE OF DISTAL END OF LEFT RADIUS WITH ROUTINE HEALING, UNSPECIFIED FRACTURE MORPHOLOGY, SUBSEQUENT ENCOUNTER: Primary | ICD-10-CM

## 2023-08-15 DIAGNOSIS — S52.502D CLOSED FRACTURE OF DISTAL END OF LEFT RADIUS WITH ROUTINE HEALING, UNSPECIFIED FRACTURE MORPHOLOGY, SUBSEQUENT ENCOUNTER: ICD-10-CM

## 2023-08-15 DIAGNOSIS — M54.50 LUMBAR BACK PAIN: ICD-10-CM

## 2023-08-15 PROCEDURE — 73110 X-RAY EXAM OF WRIST: CPT | Mod: TC,FY,LT

## 2023-08-15 PROCEDURE — 73110 X-RAY EXAM OF WRIST: CPT | Mod: 26,LT,, | Performed by: RADIOLOGY

## 2023-08-15 PROCEDURE — 73110 XR WRIST COMPLETE 3 VIEWS LEFT: ICD-10-PCS | Mod: 26,LT,, | Performed by: RADIOLOGY

## 2023-08-15 PROCEDURE — 99215 OFFICE O/P EST HI 40 MIN: CPT | Performed by: STUDENT IN AN ORGANIZED HEALTH CARE EDUCATION/TRAINING PROGRAM

## 2023-08-15 RX ORDER — HYDROCODONE BITARTRATE AND ACETAMINOPHEN 5; 325 MG/1; MG/1
1 TABLET ORAL EVERY 6 HOURS PRN
Qty: 20 TABLET | Refills: 0 | Status: SHIPPED | OUTPATIENT
Start: 2023-08-15 | End: 2023-08-22 | Stop reason: SDUPTHER

## 2023-08-15 NOTE — PROGRESS NOTES
[facility] Family [facility]  History & Physical    SUBJECTIVE:     Chief Complaint:   Chief Complaint   Patient presents with    Medication Refill       History of Present Illness:  48 y.o. female who  has a past medical history of Bronchitis. presents to clinic today for Patient evaluated for left arm fracture with continued pain. Patient previously seen in clinic following this fracture. Patient was expected to follow up in ortho clinic but soonest appointment was September. Patient returns with continued pain in wrist and discomfort from arm splint. Patient requesting pain medication. Patient reports that there is some numbness along the lateral portion of the left thumb.       Allergies:  Review of patient's allergies indicates:   Allergen Reactions    Amoxicillin Hives and Itching    Penicillins Other (See Comments)     Patient states she stops breathing    Naproxen Nausea Only       Home Medications:  Current Outpatient Medications on File Prior to Visit   Medication Sig    albuterol (PROAIR HFA) 90 mcg/actuation inhaler Inhale 2 puffs into the lungs every 6 (six) hours as needed for Wheezing. Rescue    dicyclomine (BENTYL) 10 MG capsule Take 1 capsule (10 mg total) by mouth 4 (four) times daily (before meals and nightly).    DULoxetine (CYMBALTA) 60 MG capsule Take 1 capsule (60 mg total) by mouth once daily.    ondansetron (ZOFRAN-ODT) 4 MG TbDL DIS ONE T PO TID    pregabalin (LYRICA) 75 MG capsule Take 1 capsule (75 mg total) by mouth 2 (two) times daily.    albuterol (PROVENTIL) 2.5 mg /3 mL (0.083 %) nebulizer solution Inhale contents of 1 vial (3 mLs)  by nebulization every 6 (six) hours as needed for Wheezing or Shortness of Breath. Rescue (Patient not taking: Reported on 8/15/2023)    loperamide (IMODIUM) 2 mg capsule Take 1 capsule (2 mg total) by mouth 4 (four) times daily as needed for Diarrhea. (Patient not taking: Reported on 4/26/2023)     No current facility-administered medications on file prior to  visit.       Past Medical History:   Diagnosis Date    Bronchitis      History reviewed. No pertinent surgical history.  Family History   Problem Relation Age of Onset    Breast cancer Maternal Aunt     COPD Mother     Emphysema Mother     Depression Mother     Coronary artery disease Mother     No Known Problems Father      Social History     Tobacco Use    Smoking status: Every Day     Current packs/day: 1.00     Types: Cigarettes    Smokeless tobacco: Never   Substance Use Topics    Alcohol use: No    Drug use: Yes     Types: Marijuana        Review of Systems   Musculoskeletal:  Positive for joint pain.   Neurological:  Positive for sensory change.        Numbness in left thumb        OBJECTIVE:     Vital Signs:  Pulse: 85 (08/15/23 1403)  BP: 107/72 (08/15/23 1403)  Body mass index is 30.52 kg/m².  Physical Exam  HENT:      Head: Normocephalic.      Right Ear: External ear normal.      Left Ear: External ear normal.      Nose: Nose normal.      Mouth/Throat:      Mouth: Mucous membranes are moist.   Eyes:      Pupils: Pupils are equal, round, and reactive to light.   Cardiovascular:      Rate and Rhythm: Normal rate.   Pulmonary:      Effort: Pulmonary effort is normal.   Abdominal:      Palpations: Abdomen is soft.   Musculoskeletal:         General: Normal range of motion.      Cervical back: Normal range of motion.      Comments: Splint in place extending to from mid hand to elbow of left arm     Skin:     General: Skin is warm.      Capillary Refill: Capillary refill takes less than 2 seconds.   Neurological:      General: No focal deficit present.      Mental Status: She is alert and oriented to person, place, and time.   Psychiatric:         Mood and Affect: Mood normal.         Laboratory:  Hemoglobin A1C   Date Value Ref Range Status   04/26/2023 5.1 4.0 - 5.6 % Final     Comment:     ADA Screening Guidelines:  5.7-6.4%  Consistent with prediabetes  >or=6.5%  Consistent with diabetes    High levels of  fetal hemoglobin interfere with the HbA1C  assay. Heterozygous hemoglobin variants (HbS, HgC, etc)do  not significantly interfere with this assay.   However, presence of multiple variants may affect accuracy.     07/13/2018 5.1 4.0 - 5.6 % Final     Comment:     ADA Screening Guidelines:  5.7-6.4%  Consistent with prediabetes  >or=6.5%  Consistent with diabetes  High levels of fetal hemoglobin interfere with the HbA1C  assay. Heterozygous hemoglobin variants (HbS, HgC, etc)do  not significantly interfere with this assay.   However, presence of multiple variants may affect accuracy.         A/P:  Brissa was seen today for medication refill. Cast applied in clinic. Repeat xrays ordered. Short course of Norco provided. Patient to follow up with our clinic in 1 week and with Orthopedics at scheduled appointment. Following cast placement. Patient reported improved comfort. Patient showed appropriate capillary refill in all digits. Patient was grossly neurovascularly intact (except for small region of thumb which was present prior to short-arm cast placement and has been present for multiple weeks).      Diagnoses and all orders for this visit:    Closed fracture of distal end of left radius with routine healing, unspecified fracture morphology, subsequent encounter  -     X-Ray Wrist Complete 3 views Left; Future  -     HYDROcodone-acetaminophen (NORCO) 5-325 mg per tablet; Take 1 tablet by mouth every 6 (six) hours as needed for Pain.  -     Cast application          Follow up in about 1 week (around 8/22/2023).      Antwan Rodriguez MD  [facility] Family Medicine PGY-3  08/17/2023

## 2023-08-17 NOTE — PROGRESS NOTES
Cast application    Date/Time: 8/17/2023 7:21 AM    Performed by: Roberto Carlos Ryder DO  Authorized by: Roberto Carlos Ryder,   Location details: left arm  Splint type: Short arm cast.  Supplies used: Fiberglass.  No neurovascular change: Neurovascularly unchanged following the cast application procedure.  Patient tolerance: Patient tolerated the procedure well with no immediate complications

## 2023-08-17 NOTE — PROGRESS NOTES
I assume primary medical responsibility for this patient. I have reviewed the history, physical, and assessment & treatment plan with the resident and agree that the care is reasonable and necessary. This service has been performed by a resident with the presence of a teaching physician under the primary care exception. If necessary, an addendum of additional findings or evaluation beyond the resident documentation will be noted below.        Roberto Carlos Ryder Jr., DO    [facility]

## 2023-08-22 ENCOUNTER — OFFICE VISIT (OUTPATIENT)
Dept: FAMILY MEDICINE | Facility: HOSPITAL | Age: 48
End: 2023-08-22
Payer: MEDICAID

## 2023-08-22 VITALS
HEIGHT: 67 IN | SYSTOLIC BLOOD PRESSURE: 105 MMHG | WEIGHT: 195.56 LBS | HEART RATE: 80 BPM | DIASTOLIC BLOOD PRESSURE: 68 MMHG | BODY MASS INDEX: 30.69 KG/M2

## 2023-08-22 DIAGNOSIS — Z72.0 TOBACCO USE: ICD-10-CM

## 2023-08-22 DIAGNOSIS — S52.502D CLOSED FRACTURE OF DISTAL END OF LEFT RADIUS WITH ROUTINE HEALING, UNSPECIFIED FRACTURE MORPHOLOGY, SUBSEQUENT ENCOUNTER: Primary | ICD-10-CM

## 2023-08-22 DIAGNOSIS — J42 CHRONIC BRONCHITIS, UNSPECIFIED CHRONIC BRONCHITIS TYPE: ICD-10-CM

## 2023-08-22 PROCEDURE — 99214 OFFICE O/P EST MOD 30 MIN: CPT | Performed by: STUDENT IN AN ORGANIZED HEALTH CARE EDUCATION/TRAINING PROGRAM

## 2023-08-22 RX ORDER — HYDROCODONE BITARTRATE AND ACETAMINOPHEN 5; 325 MG/1; MG/1
1 TABLET ORAL EVERY 6 HOURS PRN
Qty: 20 TABLET | Refills: 0 | Status: SHIPPED | OUTPATIENT
Start: 2023-08-22 | End: 2023-08-29

## 2023-08-22 RX ORDER — ALBUTEROL SULFATE 90 UG/1
2 AEROSOL, METERED RESPIRATORY (INHALATION) EVERY 6 HOURS PRN
Qty: 18 G | Refills: 0 | Status: SHIPPED | OUTPATIENT
Start: 2023-08-22 | End: 2023-09-18 | Stop reason: SDUPTHER

## 2023-08-22 RX ORDER — ONDANSETRON 4 MG/1
TABLET, ORALLY DISINTEGRATING ORAL
Refills: 1 | Status: CANCELLED | OUTPATIENT
Start: 2023-08-22

## 2023-08-22 NOTE — PROGRESS NOTES
[facility]  History & Physical    SUBJECTIVE:     Chief Complaint:   Chief Complaint   Patient presents with    Follow-up    Results       History of Present Illness:  48 y.o. female who  has a past medical history of Bronchitis. presents to clinic today for follow for fracture with casting performed 1 week ago. Patient states improvement in movement, strength and numbness. Patient says pain still present at her wrist. Patient states that there is still some swelling that occurs nightly.       Allergies:  Review of patient's allergies indicates:   Allergen Reactions    Amoxicillin Hives and Itching    Penicillins Other (See Comments)     Patient states she stops breathing    Naproxen Nausea Only       Home Medications:  Current Outpatient Medications on File Prior to Visit   Medication Sig    dicyclomine (BENTYL) 10 MG capsule Take 1 capsule (10 mg total) by mouth 4 (four) times daily (before meals and nightly).    DULoxetine (CYMBALTA) 60 MG capsule Take 1 capsule (60 mg total) by mouth once daily.    ondansetron (ZOFRAN-ODT) 4 MG TbDL DIS ONE T PO TID    pregabalin (LYRICA) 75 MG capsule Take 1 capsule (75 mg total) by mouth 2 (two) times daily.    albuterol (PROVENTIL) 2.5 mg /3 mL (0.083 %) nebulizer solution Inhale contents of 1 vial (3 mLs)  by nebulization every 6 (six) hours as needed for Wheezing or Shortness of Breath. Rescue (Patient not taking: Reported on 8/15/2023)    loperamide (IMODIUM) 2 mg capsule Take 1 capsule (2 mg total) by mouth 4 (four) times daily as needed for Diarrhea. (Patient not taking: Reported on 4/26/2023)     No current facility-administered medications on file prior to visit.       Past Medical History:   Diagnosis Date    Bronchitis      No past surgical history on file.  Family History   Problem Relation Age of Onset    Breast cancer Maternal Aunt     COPD Mother     Emphysema Mother     Depression Mother     Coronary artery disease Mother     No Known Problems Father       Social History     Tobacco Use    Smoking status: Every Day     Current packs/day: 1.00     Types: Cigarettes    Smokeless tobacco: Never   Substance Use Topics    Alcohol use: No    Drug use: Yes     Types: Marijuana        Review of Systems   Constitutional:  Negative for fever and weight loss.   HENT:  Negative for hearing loss and sore throat.    Eyes:  Negative for blurred vision.   Respiratory:  Negative for cough, shortness of breath and wheezing.    Cardiovascular:  Negative for chest pain and leg swelling.   Gastrointestinal:  Negative for heartburn, nausea and vomiting.   Genitourinary:  Negative for dysuria.   Musculoskeletal:  Positive for joint pain and myalgias. Negative for back pain.   Neurological:  Negative for dizziness, weakness and headaches.        OBJECTIVE:     Vital Signs:  Pulse: 80 (08/22/23 1408)  BP: 105/68 (08/22/23 1408)  Body mass index is 30.63 kg/m².  Physical Exam  Constitutional:       Appearance: Normal appearance.   HENT:      Head: Normocephalic.      Right Ear: External ear normal.      Nose: Nose normal.      Mouth/Throat:      Mouth: Mucous membranes are moist.   Eyes:      Extraocular Movements: Extraocular movements intact.      Pupils: Pupils are equal, round, and reactive to light.   Cardiovascular:      Rate and Rhythm: Normal rate.      Pulses: Normal pulses.   Pulmonary:      Effort: Pulmonary effort is normal.   Abdominal:      General: Abdomen is flat.      Palpations: Abdomen is soft.   Musculoskeletal:         General: Swelling and tenderness present.      Cervical back: Normal range of motion.      Comments: Pain with movement of arm  Mid arm cast in place  Normal capillary refill of extremities  Numbness of thumb previously described has improved.    Skin:     General: Skin is warm.      Capillary Refill: Capillary refill takes less than 2 seconds.   Neurological:      General: No focal deficit present.      Mental Status: She is alert and oriented to  person, place, and time.         Laboratory:  Hemoglobin A1C   Date Value Ref Range Status   04/26/2023 5.1 4.0 - 5.6 % Final     Comment:     ADA Screening Guidelines:  5.7-6.4%  Consistent with prediabetes  >or=6.5%  Consistent with diabetes    High levels of fetal hemoglobin interfere with the HbA1C  assay. Heterozygous hemoglobin variants (HbS, HgC, etc)do  not significantly interfere with this assay.   However, presence of multiple variants may affect accuracy.     07/13/2018 5.1 4.0 - 5.6 % Final     Comment:     ADA Screening Guidelines:  5.7-6.4%  Consistent with prediabetes  >or=6.5%  Consistent with diabetes  High levels of fetal hemoglobin interfere with the HbA1C  assay. Heterozygous hemoglobin variants (HbS, HgC, etc)do  not significantly interfere with this assay.   However, presence of multiple variants may affect accuracy.         A/P:  Brissa was seen today for follow-up and results. Casted arm assessed. Some pain still present but patient reports that the cast is more comfortable than her previous splint. Patient to follow up with Orthopedics in 2 weeks. Neurovascularly intact in left hand. Strength and sensation preferred.     Diagnoses and all orders for this visit:    Closed fracture of distal end of left radius with routine healing, unspecified fracture morphology, subsequent encounter  -     HYDROcodone-acetaminophen (NORCO) 5-325 mg per tablet; Take 1 tablet by mouth every 6 (six) hours as needed for Pain.            Follow up if symptoms worsen or fail to improve.      Antwan Rodriguez MD  [facility] Medicine PGY-3  08/23/2023

## 2023-08-24 NOTE — PROGRESS NOTES
I assume primary medical responsibility for this patient. I have reviewed the history, physical, and assessment & treatment plan with the resident and agree that the care is reasonable and necessary. This service has been performed by a resident without the presence of a teaching physician under the primary care exception. If necessary, an addendum of additional findings or evaluation beyond the resident documentation will be noted below.        Roberto Carlos Ryder Jr., DO    [facility]

## 2023-08-27 DIAGNOSIS — K58.0 IRRITABLE BOWEL SYNDROME WITH DIARRHEA: ICD-10-CM

## 2023-08-28 RX ORDER — DICYCLOMINE HYDROCHLORIDE 10 MG/1
10 CAPSULE ORAL
Qty: 120 CAPSULE | Refills: 0 | Status: SHIPPED | OUTPATIENT
Start: 2023-08-28 | End: 2023-09-30

## 2023-09-05 DIAGNOSIS — S52.502D CLOSED FRACTURE OF DISTAL END OF LEFT RADIUS WITH ROUTINE HEALING, UNSPECIFIED FRACTURE MORPHOLOGY, SUBSEQUENT ENCOUNTER: Primary | ICD-10-CM

## 2023-09-06 ENCOUNTER — OFFICE VISIT (OUTPATIENT)
Dept: ORTHOPEDICS | Facility: CLINIC | Age: 48
End: 2023-09-06
Payer: MEDICAID

## 2023-09-06 ENCOUNTER — HOSPITAL ENCOUNTER (OUTPATIENT)
Dept: RADIOLOGY | Facility: HOSPITAL | Age: 48
Discharge: HOME | End: 2023-09-06
Attending: ORTHOPAEDIC SURGERY
Payer: MEDICAID

## 2023-09-06 VITALS — HEIGHT: 67 IN | BODY MASS INDEX: 30.69 KG/M2 | WEIGHT: 195.56 LBS

## 2023-09-06 DIAGNOSIS — S52.502D CLOSED FRACTURE OF DISTAL END OF LEFT RADIUS WITH ROUTINE HEALING, UNSPECIFIED FRACTURE MORPHOLOGY, SUBSEQUENT ENCOUNTER: ICD-10-CM

## 2023-09-06 DIAGNOSIS — S62.102A LEFT WRIST FRACTURE, CLOSED, INITIAL ENCOUNTER: ICD-10-CM

## 2023-09-06 PROCEDURE — 3008F BODY MASS INDEX DOCD: CPT | Mod: CPTII,,, | Performed by: ORTHOPAEDIC SURGERY

## 2023-09-06 PROCEDURE — 73110 X-RAY EXAM OF WRIST: CPT | Mod: 26,LT,, | Performed by: RADIOLOGY

## 2023-09-06 PROCEDURE — 3044F HG A1C LEVEL LT 7.0%: CPT | Mod: CPTII,,, | Performed by: ORTHOPAEDIC SURGERY

## 2023-09-06 PROCEDURE — 73110 XR WRIST COMPLETE 3 VIEWS LEFT: ICD-10-PCS | Mod: 26,LT,, | Performed by: RADIOLOGY

## 2023-09-06 PROCEDURE — 99999 PR PBB SHADOW E&M-EST. PATIENT-LVL III: CPT | Mod: PBBFAC,,, | Performed by: ORTHOPAEDIC SURGERY

## 2023-09-06 PROCEDURE — 1159F PR MEDICATION LIST DOCUMENTED IN MEDICAL RECORD: ICD-10-PCS | Mod: CPTII,,, | Performed by: ORTHOPAEDIC SURGERY

## 2023-09-06 PROCEDURE — 3044F PR MOST RECENT HEMOGLOBIN A1C LEVEL <7.0%: ICD-10-PCS | Mod: CPTII,,, | Performed by: ORTHOPAEDIC SURGERY

## 2023-09-06 PROCEDURE — 99213 OFFICE O/P EST LOW 20 MIN: CPT | Mod: PBBFAC,PO | Performed by: ORTHOPAEDIC SURGERY

## 2023-09-06 PROCEDURE — 1160F RVW MEDS BY RX/DR IN RCRD: CPT | Mod: CPTII,,, | Performed by: ORTHOPAEDIC SURGERY

## 2023-09-06 PROCEDURE — 99204 OFFICE O/P NEW MOD 45 MIN: CPT | Mod: S$PBB,,, | Performed by: ORTHOPAEDIC SURGERY

## 2023-09-06 PROCEDURE — 1159F MED LIST DOCD IN RCRD: CPT | Mod: CPTII,,, | Performed by: ORTHOPAEDIC SURGERY

## 2023-09-06 PROCEDURE — 99204 PR OFFICE/OUTPT VISIT, NEW, LEVL IV, 45-59 MIN: ICD-10-PCS | Mod: S$PBB,,, | Performed by: ORTHOPAEDIC SURGERY

## 2023-09-06 PROCEDURE — 3008F PR BODY MASS INDEX (BMI) DOCUMENTED: ICD-10-PCS | Mod: CPTII,,, | Performed by: ORTHOPAEDIC SURGERY

## 2023-09-06 PROCEDURE — 1160F PR REVIEW ALL MEDS BY PRESCRIBER/CLIN PHARMACIST DOCUMENTED: ICD-10-PCS | Mod: CPTII,,, | Performed by: ORTHOPAEDIC SURGERY

## 2023-09-06 PROCEDURE — 99999 PR PBB SHADOW E&M-EST. PATIENT-LVL III: ICD-10-PCS | Mod: PBBFAC,,, | Performed by: ORTHOPAEDIC SURGERY

## 2023-09-06 PROCEDURE — 73110 X-RAY EXAM OF WRIST: CPT | Mod: TC,PO,LT

## 2023-09-06 NOTE — PROGRESS NOTES
Subjective:      Patient ID: Brissa Dietz is a 48 y.o. female.    Chief Complaint: No chief complaint on file.    HPI  48-year-old female with a 6-8 week history of left wrist discomfort.  She sustained accidental blunt trauma during a fall.  She states she could not find someone to accept her insurance to see her for a wrist fracture that has been treated by her family physician.  She is been in a cast since the initial injury.  Pain has improved with relative rest.  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  ROS      Objective:    Ortho Exam         X-Ray Wrist Complete Left  EXAMINATION:  XR WRIST COMPLETE 3 VIEWS LEFT    CLINICAL HISTORY:  Unspecified fracture of the lower end of left radius, subsequent encounter for closed fracture with routine healing    TECHNIQUE:  PA, lateral, and oblique views of the left wrist were performed.    COMPARISON:  08/15/2023    FINDINGS:  Overlying cast material obscures fine detail.    There are subacute fractures of the ulnar styloid and distal radius which are mildly displaced without detrimental change in alignment.  Mild carpal degenerative change.    Electronically signed by: Ken Jaramillo  Date:    09/06/2023  Time:    12:58       My Radiographs Findings:    I have personally reviewed radiographs and concur with above findings    Assessment:       Encounter Diagnosis   Name Primary?    Left wrist fracture, closed, initial encounter          Plan:       I have discussed medical condition treatment options with her at length.  We will get her cast off today get her into a removable brace.  I have discussed the possibility of a malunion based on the amount of angulation.  I think this should be okay with it being a non dominant arm and a low demand individual.  I have suggested repeat radiographs in approximately 4 weeks I will see her sooner if any questions or problems.  In the meantime gentle digit range of motion exercises.        Past Medical History:    Diagnosis Date    Bronchitis      History reviewed. No pertinent surgical history.      Current Outpatient Medications:     albuterol (PROAIR HFA) 90 mcg/actuation inhaler, Inhale 2 puffs into the lungs every 6 (six) hours as needed for Wheezing. Rescue, Disp: 18 g, Rfl: 0    albuterol (PROVENTIL) 2.5 mg /3 mL (0.083 %) nebulizer solution, Inhale contents of 1 vial (3 mLs)  by nebulization every 6 (six) hours as needed for Wheezing or Shortness of Breath. Rescue (Patient not taking: Reported on 8/15/2023), Disp: 90 mL, Rfl: 2    dicyclomine (BENTYL) 10 MG capsule, Take 1 capsule (10 mg total) by mouth 4 (four) times daily (before meals and nightly)., Disp: 120 capsule, Rfl: 0    DULoxetine (CYMBALTA) 60 MG capsule, Take 1 capsule (60 mg total) by mouth once daily., Disp: 90 capsule, Rfl: 3    loperamide (IMODIUM) 2 mg capsule, Take 1 capsule (2 mg total) by mouth 4 (four) times daily as needed for Diarrhea. (Patient not taking: Reported on 4/26/2023), Disp: 90 capsule, Rfl: 2    ondansetron (ZOFRAN-ODT) 4 MG TbDL, DIS ONE T PO TID, Disp: , Rfl: 1    pregabalin (LYRICA) 75 MG capsule, Take 1 capsule (75 mg total) by mouth 2 (two) times daily., Disp: 60 capsule, Rfl: 5    Review of patient's allergies indicates:   Allergen Reactions    Amoxicillin Hives and Itching    Penicillins Other (See Comments)     Patient states she stops breathing    Naproxen Nausea Only       Family History   Problem Relation Age of Onset    Breast cancer Maternal Aunt     COPD Mother     Emphysema Mother     Depression Mother     Coronary artery disease Mother     No Known Problems Father      Social History     Occupational History    Not on file   Tobacco Use    Smoking status: Every Day     Current packs/day: 1.00     Types: Cigarettes    Smokeless tobacco: Never   Substance and Sexual Activity    Alcohol use: No    Drug use: Yes     Types: Marijuana    Sexual activity: Not Currently

## 2023-09-18 ENCOUNTER — PATIENT MESSAGE (OUTPATIENT)
Dept: FAMILY MEDICINE | Facility: HOSPITAL | Age: 48
End: 2023-09-18
Payer: MEDICAID

## 2023-09-18 DIAGNOSIS — Z72.0 TOBACCO USE: ICD-10-CM

## 2023-09-18 DIAGNOSIS — J42 CHRONIC BRONCHITIS, UNSPECIFIED CHRONIC BRONCHITIS TYPE: ICD-10-CM

## 2023-09-18 RX ORDER — ALBUTEROL SULFATE 0.83 MG/ML
2.5 SOLUTION RESPIRATORY (INHALATION) EVERY 6 HOURS PRN
Qty: 90 ML | Refills: 2 | Status: SHIPPED | OUTPATIENT
Start: 2023-09-18 | End: 2023-12-26 | Stop reason: SDUPTHER

## 2023-09-18 RX ORDER — ALBUTEROL SULFATE 90 UG/1
2 AEROSOL, METERED RESPIRATORY (INHALATION) EVERY 6 HOURS PRN
Qty: 18 G | Refills: 0 | Status: SHIPPED | OUTPATIENT
Start: 2023-09-18 | End: 2023-10-04 | Stop reason: SDUPTHER

## 2023-09-19 DIAGNOSIS — M54.12 CERVICAL RADICULOPATHY: ICD-10-CM

## 2023-09-19 RX ORDER — DULOXETIN HYDROCHLORIDE 60 MG/1
60 CAPSULE, DELAYED RELEASE ORAL DAILY
Qty: 90 CAPSULE | Refills: 3 | Status: SHIPPED | OUTPATIENT
Start: 2023-09-19 | End: 2024-09-13

## 2023-09-26 NOTE — ADDENDUM NOTE
Addended by: GARRETT MANNING on: 9/26/2023 03:48 PM     Modules accepted: Orders     Double O-Z Plasty Text: The defect edges were debeveled with a #15 scalpel blade.  Given the location of the defect, shape of the defect and the proximity to free margins a Double O-Z plasty (double transposition flap) was deemed most appropriate.  Using a sterile surgical marker, the appropriate transposition flaps were drawn incorporating the defect and placing the expected incisions within the relaxed skin tension lines where possible. The area thus outlined was incised deep to adipose tissue with a #15 scalpel blade.  The skin margins were undermined to an appropriate distance in all directions utilizing iris scissors.  Hemostasis was achieved with electrocautery.  The flaps were then transposed into place, one clockwise and the other counterclockwise, and anchored with interrupted buried subcutaneous sutures.

## 2023-10-04 ENCOUNTER — PATIENT MESSAGE (OUTPATIENT)
Dept: FAMILY MEDICINE | Facility: HOSPITAL | Age: 48
End: 2023-10-04
Payer: MEDICAID

## 2023-10-04 DIAGNOSIS — J42 CHRONIC BRONCHITIS, UNSPECIFIED CHRONIC BRONCHITIS TYPE: ICD-10-CM

## 2023-10-04 DIAGNOSIS — Z72.0 TOBACCO USE: ICD-10-CM

## 2023-10-04 RX ORDER — ALBUTEROL SULFATE 90 UG/1
2 AEROSOL, METERED RESPIRATORY (INHALATION) EVERY 6 HOURS PRN
Qty: 18 G | Refills: 0 | Status: SHIPPED | OUTPATIENT
Start: 2023-10-04 | End: 2023-10-29 | Stop reason: SDUPTHER

## 2023-10-29 DIAGNOSIS — J42 CHRONIC BRONCHITIS, UNSPECIFIED CHRONIC BRONCHITIS TYPE: ICD-10-CM

## 2023-10-29 DIAGNOSIS — Z72.0 TOBACCO USE: ICD-10-CM

## 2023-10-31 RX ORDER — ALBUTEROL SULFATE 90 UG/1
2 AEROSOL, METERED RESPIRATORY (INHALATION) EVERY 6 HOURS PRN
Qty: 18 G | Refills: 0 | Status: SHIPPED | OUTPATIENT
Start: 2023-10-31 | End: 2023-11-20 | Stop reason: SDUPTHER

## 2023-11-15 DIAGNOSIS — M54.12 CERVICAL RADICULOPATHY: ICD-10-CM

## 2023-11-16 RX ORDER — PREGABALIN 75 MG/1
75 CAPSULE ORAL 2 TIMES DAILY
Qty: 60 CAPSULE | Refills: 5 | OUTPATIENT
Start: 2023-11-16 | End: 2024-05-14

## 2023-11-20 DIAGNOSIS — J42 CHRONIC BRONCHITIS, UNSPECIFIED CHRONIC BRONCHITIS TYPE: ICD-10-CM

## 2023-11-20 DIAGNOSIS — Z72.0 TOBACCO USE: ICD-10-CM

## 2023-11-20 DIAGNOSIS — M54.12 CERVICAL RADICULOPATHY: ICD-10-CM

## 2023-11-20 RX ORDER — PREGABALIN 75 MG/1
75 CAPSULE ORAL 2 TIMES DAILY
Qty: 60 CAPSULE | Refills: 5 | Status: CANCELLED | OUTPATIENT
Start: 2023-11-20 | End: 2024-05-18

## 2023-11-21 RX ORDER — ALBUTEROL SULFATE 90 UG/1
2 AEROSOL, METERED RESPIRATORY (INHALATION) EVERY 6 HOURS PRN
Qty: 18 G | Refills: 0 | Status: SHIPPED | OUTPATIENT
Start: 2023-11-21 | End: 2023-12-21 | Stop reason: SDUPTHER

## 2023-12-21 DIAGNOSIS — Z72.0 TOBACCO USE: ICD-10-CM

## 2023-12-21 DIAGNOSIS — J42 CHRONIC BRONCHITIS, UNSPECIFIED CHRONIC BRONCHITIS TYPE: ICD-10-CM

## 2023-12-26 DIAGNOSIS — J42 CHRONIC BRONCHITIS, UNSPECIFIED CHRONIC BRONCHITIS TYPE: ICD-10-CM

## 2023-12-26 RX ORDER — ALBUTEROL SULFATE 90 UG/1
2 AEROSOL, METERED RESPIRATORY (INHALATION) EVERY 6 HOURS PRN
Qty: 18 G | Refills: 0 | Status: SHIPPED | OUTPATIENT
Start: 2023-12-26 | End: 2024-01-19 | Stop reason: SDUPTHER

## 2023-12-30 RX ORDER — ALBUTEROL SULFATE 0.83 MG/ML
2.5 SOLUTION RESPIRATORY (INHALATION) EVERY 6 HOURS PRN
Qty: 90 ML | Refills: 2 | Status: SHIPPED | OUTPATIENT
Start: 2023-12-30 | End: 2024-12-29

## 2024-01-10 DIAGNOSIS — Z72.0 TOBACCO USE: ICD-10-CM

## 2024-01-10 DIAGNOSIS — J42 CHRONIC BRONCHITIS, UNSPECIFIED CHRONIC BRONCHITIS TYPE: ICD-10-CM

## 2024-01-11 DIAGNOSIS — J42 CHRONIC BRONCHITIS, UNSPECIFIED CHRONIC BRONCHITIS TYPE: ICD-10-CM

## 2024-01-11 DIAGNOSIS — Z72.0 TOBACCO USE: ICD-10-CM

## 2024-01-11 RX ORDER — ALBUTEROL SULFATE 90 UG/1
2 AEROSOL, METERED RESPIRATORY (INHALATION) EVERY 6 HOURS PRN
Qty: 18 G | Refills: 0 | OUTPATIENT
Start: 2024-01-11

## 2024-01-12 RX ORDER — ALBUTEROL SULFATE 90 UG/1
2 AEROSOL, METERED RESPIRATORY (INHALATION) EVERY 6 HOURS PRN
Qty: 18 G | Refills: 0 | OUTPATIENT
Start: 2024-01-12

## 2024-01-19 DIAGNOSIS — M54.12 CERVICAL RADICULOPATHY: ICD-10-CM

## 2024-01-19 DIAGNOSIS — Z72.0 TOBACCO USE: ICD-10-CM

## 2024-01-19 DIAGNOSIS — J42 CHRONIC BRONCHITIS, UNSPECIFIED CHRONIC BRONCHITIS TYPE: ICD-10-CM

## 2024-01-19 RX ORDER — ALBUTEROL SULFATE 90 UG/1
2 AEROSOL, METERED RESPIRATORY (INHALATION) EVERY 6 HOURS PRN
Qty: 8.5 G | Refills: 0 | Status: SHIPPED | OUTPATIENT
Start: 2024-01-19 | End: 2024-02-11 | Stop reason: SDUPTHER

## 2024-01-19 RX ORDER — PREGABALIN 75 MG/1
75 CAPSULE ORAL 2 TIMES DAILY
Qty: 60 CAPSULE | Refills: 5 | OUTPATIENT
Start: 2024-01-19 | End: 2024-07-17

## 2024-02-11 DIAGNOSIS — Z72.0 TOBACCO USE: ICD-10-CM

## 2024-02-11 DIAGNOSIS — J42 CHRONIC BRONCHITIS, UNSPECIFIED CHRONIC BRONCHITIS TYPE: ICD-10-CM

## 2024-02-12 DIAGNOSIS — M54.12 CERVICAL RADICULOPATHY: ICD-10-CM

## 2024-02-20 ENCOUNTER — PATIENT MESSAGE (OUTPATIENT)
Dept: FAMILY MEDICINE | Facility: HOSPITAL | Age: 49
End: 2024-02-20

## 2024-02-20 ENCOUNTER — OFFICE VISIT (OUTPATIENT)
Dept: FAMILY MEDICINE | Facility: HOSPITAL | Age: 49
End: 2024-02-20
Payer: MEDICAID

## 2024-02-20 VITALS
HEART RATE: 73 BPM | WEIGHT: 211.44 LBS | HEIGHT: 67 IN | DIASTOLIC BLOOD PRESSURE: 69 MMHG | BODY MASS INDEX: 33.19 KG/M2 | SYSTOLIC BLOOD PRESSURE: 118 MMHG

## 2024-02-20 DIAGNOSIS — K58.0 IRRITABLE BOWEL SYNDROME WITH DIARRHEA: ICD-10-CM

## 2024-02-20 DIAGNOSIS — M54.12 CERVICAL RADICULOPATHY: ICD-10-CM

## 2024-02-20 DIAGNOSIS — R60.0 LOWER EXTREMITY EDEMA: ICD-10-CM

## 2024-02-20 DIAGNOSIS — S52.502D CLOSED FRACTURE OF DISTAL END OF LEFT RADIUS WITH ROUTINE HEALING, UNSPECIFIED FRACTURE MORPHOLOGY, SUBSEQUENT ENCOUNTER: ICD-10-CM

## 2024-02-20 DIAGNOSIS — Z72.0 TOBACCO USE: ICD-10-CM

## 2024-02-20 DIAGNOSIS — J42 CHRONIC BRONCHITIS, UNSPECIFIED CHRONIC BRONCHITIS TYPE: ICD-10-CM

## 2024-02-20 DIAGNOSIS — M54.31 BILATERAL SCIATICA: ICD-10-CM

## 2024-02-20 DIAGNOSIS — M25.632 STIFFNESS OF LEFT WRIST JOINT: ICD-10-CM

## 2024-02-20 DIAGNOSIS — M54.50 LUMBAR BACK PAIN: Primary | ICD-10-CM

## 2024-02-20 DIAGNOSIS — M54.32 BILATERAL SCIATICA: ICD-10-CM

## 2024-02-20 PROCEDURE — 99214 OFFICE O/P EST MOD 30 MIN: CPT | Performed by: STUDENT IN AN ORGANIZED HEALTH CARE EDUCATION/TRAINING PROGRAM

## 2024-02-20 RX ORDER — PREGABALIN 75 MG/1
75 CAPSULE ORAL 2 TIMES DAILY
Qty: 60 CAPSULE | Refills: 5 | OUTPATIENT
Start: 2024-02-20 | End: 2024-08-18

## 2024-02-20 RX ORDER — DICYCLOMINE HYDROCHLORIDE 10 MG/1
10 CAPSULE ORAL 3 TIMES DAILY
COMMUNITY

## 2024-02-20 RX ORDER — PREGABALIN 75 MG/1
75 CAPSULE ORAL 2 TIMES DAILY
Qty: 60 CAPSULE | Refills: 5 | Status: SHIPPED | OUTPATIENT
Start: 2024-02-20 | End: 2024-08-18

## 2024-02-20 RX ORDER — ALBUTEROL SULFATE 90 UG/1
2 AEROSOL, METERED RESPIRATORY (INHALATION) EVERY 6 HOURS PRN
Qty: 8.5 G | Refills: 0 | Status: SHIPPED | OUTPATIENT
Start: 2024-02-20 | End: 2024-03-18 | Stop reason: SDUPTHER

## 2024-02-20 RX ORDER — AMITRIPTYLINE HYDROCHLORIDE 10 MG/1
10 TABLET, FILM COATED ORAL NIGHTLY PRN
Qty: 60 TABLET | Refills: 0 | Status: SHIPPED | OUTPATIENT
Start: 2024-02-20 | End: 2024-04-16 | Stop reason: SDUPTHER

## 2024-02-20 RX ORDER — DICYCLOMINE HYDROCHLORIDE 10 MG/1
10 CAPSULE ORAL
Qty: 120 CAPSULE | Refills: 0 | Status: SHIPPED | OUTPATIENT
Start: 2024-02-20 | End: 2024-04-04 | Stop reason: SDUPTHER

## 2024-02-20 NOTE — PROGRESS NOTES
[facility]  History & Physical    SUBJECTIVE:     Chief Complaint:   Chief Complaint   Patient presents with    Medication Refill       History of Present Illness:  48 y.o. female who  has a past medical history of Bronchitis. presents to clinic today for low back pain, wrist pain, hand pain. Patient reports continued stiffness and pain in her left wrist following fracture. Patient states the pain is mostly provoked when lifting objects or with certain motions. Patient also notes reduced range of motion of affected joint. Patients back pain has been worsening for the last month. Patient states she has a new grand daughter and has been bending over often for diaper changes and to interact with the baby. Patient also states hand pain and sciatica are sometimes painful enough to prevent sleep.      Allergies:  Review of patient's allergies indicates:   Allergen Reactions    Amoxicillin Hives and Itching    Penicillins Other (See Comments)     Patient states she stops breathing    Naproxen Nausea Only       Home Medications:  Current Outpatient Medications on File Prior to Visit   Medication Sig    albuterol (PROAIR HFA) 90 mcg/actuation inhaler Inhale 2 puffs into the lungs every 6 (six) hours as needed for Wheezing. Rescue    albuterol (PROVENTIL) 2.5 mg /3 mL (0.083 %) nebulizer solution Inhale contents of 1 vial (3 mLs)  by nebulization every 6 (six) hours as needed for Wheezing or Shortness of Breath. Rescue    DULoxetine (CYMBALTA) 60 MG capsule Take 1 capsule (60 mg total) by mouth once daily.    loperamide (IMODIUM) 2 mg capsule Take 1 capsule (2 mg total) by mouth 4 (four) times daily as needed for Diarrhea.    dicyclomine (BENTYL) 10 MG capsule Take 10 mg by mouth 3 (three) times daily.    [DISCONTINUED] ondansetron (ZOFRAN-ODT) 4 MG TbDL DIS ONE T PO TID    [DISCONTINUED] pregabalin (LYRICA) 75 MG capsule Take 1 capsule (75 mg total) by mouth 2 (two) times daily.     No current facility-administered  medications on file prior to visit.       Past Medical History:   Diagnosis Date    Bronchitis      No past surgical history on file.  Family History   Problem Relation Age of Onset    Breast cancer Maternal Aunt     COPD Mother     Emphysema Mother     Depression Mother     Coronary artery disease Mother     No Known Problems Father      Social History     Tobacco Use    Smoking status: Every Day     Current packs/day: 1.00     Types: Cigarettes    Smokeless tobacco: Never   Substance Use Topics    Alcohol use: No    Drug use: Yes     Types: Marijuana        Review of Systems   Constitutional:  Negative for fever and weight loss.   HENT:  Negative for hearing loss and sore throat.    Eyes:  Negative for blurred vision.   Respiratory:  Negative for cough, shortness of breath and wheezing.    Cardiovascular:  Negative for chest pain and leg swelling.   Gastrointestinal:  Negative for heartburn, nausea and vomiting.   Genitourinary:  Negative for dysuria.   Musculoskeletal:  Positive for back pain and joint pain. Negative for myalgias.   Neurological:  Negative for dizziness, weakness and headaches.        OBJECTIVE:     Vital Signs:  Pulse: 73 (02/20/24 1020)  BP: 118/69 (02/20/24 1020)  Body mass index is 33.11 kg/m².  Physical Exam  Constitutional:       Appearance: She is obese.   HENT:      Head: Normocephalic.      Right Ear: External ear normal.      Left Ear: External ear normal.      Nose: Nose normal.      Mouth/Throat:      Mouth: Mucous membranes are moist.   Eyes:      Extraocular Movements: Extraocular movements intact.   Cardiovascular:      Rate and Rhythm: Normal rate.   Pulmonary:      Effort: Pulmonary effort is normal.   Abdominal:      Palpations: Abdomen is soft.   Musculoskeletal:         General: Tenderness present.      Left wrist: Tenderness present. No swelling, deformity or crepitus. Decreased range of motion.      Cervical back: Normal range of motion.   Neurological:      Mental Status:  She is alert.         Laboratory:  Hemoglobin A1C   Date Value Ref Range Status   04/26/2023 5.1 4.0 - 5.6 % Final     Comment:     ADA Screening Guidelines:  5.7-6.4%  Consistent with prediabetes  >or=6.5%  Consistent with diabetes    High levels of fetal hemoglobin interfere with the HbA1C  assay. Heterozygous hemoglobin variants (HbS, HgC, etc)do  not significantly interfere with this assay.   However, presence of multiple variants may affect accuracy.     07/13/2018 5.1 4.0 - 5.6 % Final     Comment:     ADA Screening Guidelines:  5.7-6.4%  Consistent with prediabetes  >or=6.5%  Consistent with diabetes  High levels of fetal hemoglobin interfere with the HbA1C  assay. Heterozygous hemoglobin variants (HbS, HgC, etc)do  not significantly interfere with this assay.   However, presence of multiple variants may affect accuracy.         A/P:  Brissa was seen today for medication refill.    Diagnoses and all orders for this visit:    Lumbar back pain   - Provided lumbar back strengthening exercises  Stiffness of left wrist joint  -     X-Ray Wrist Complete Left; Future   - Provided wrist rehabilitation exercises, encouraged to return to formal OT   - Advised follow up appointment with Orthopedics  Irritable bowel syndrome with diarrhea  -     dicyclomine (BENTYL) 10 MG capsule; Take 1 capsule (10 mg total) by mouth 4 (four) times daily before meals and nightly.    Cervical radiculopathy  -     pregabalin (LYRICA) 75 MG capsule; Take 1 capsule (75 mg total) by mouth 2 (two) times daily.  -     amitriptyline (ELAVIL) 10 MG tablet; Take 1 tablet (10 mg total) by mouth nightly as needed for Insomnia.   - Patient advised to take Elavil or Lyrica but not both together, If side effects of elavil are severe return to lyrica  Chronic bronchitis, unspecified chronic bronchitis type   - Refilled Albuterol  Tobacco use   - Patient not ready to quit at this time, Will consider it   Lower extremity edema   - Advised to elevate legs  nightly, and use compression socks  Bilateral sciatica  -     amitriptyline (ELAVIL) 10 MG tablet; Take 1 tablet (10 mg total) by mouth nightly as needed for Insomnia.   - Patient advised to take Elavil or Lyrica but not both together, If side effects of elavil are severe return to lyrica  Closed fracture of distal end of left radius with routine healing, unspecified fracture morphology, subsequent encounter  -     X-Ray Wrist Complete Left; Future        Follow up in about 4 weeks (around 3/19/2024).      Antwan Rodriguez MD  [facility] PGY-3  02/20/2024

## 2024-03-18 DIAGNOSIS — Z72.0 TOBACCO USE: ICD-10-CM

## 2024-03-18 DIAGNOSIS — J42 CHRONIC BRONCHITIS, UNSPECIFIED CHRONIC BRONCHITIS TYPE: ICD-10-CM

## 2024-03-19 RX ORDER — ALBUTEROL SULFATE 90 UG/1
2 AEROSOL, METERED RESPIRATORY (INHALATION) EVERY 6 HOURS PRN
Qty: 8.5 G | Refills: 0 | Status: SHIPPED | OUTPATIENT
Start: 2024-03-19 | End: 2024-04-04 | Stop reason: SDUPTHER

## 2024-04-04 DIAGNOSIS — J42 CHRONIC BRONCHITIS, UNSPECIFIED CHRONIC BRONCHITIS TYPE: ICD-10-CM

## 2024-04-04 DIAGNOSIS — K58.0 IRRITABLE BOWEL SYNDROME WITH DIARRHEA: ICD-10-CM

## 2024-04-04 DIAGNOSIS — Z72.0 TOBACCO USE: ICD-10-CM

## 2024-04-07 RX ORDER — ALBUTEROL SULFATE 90 UG/1
2 AEROSOL, METERED RESPIRATORY (INHALATION) EVERY 6 HOURS PRN
Qty: 8.5 G | Refills: 0 | Status: SHIPPED | OUTPATIENT
Start: 2024-04-07 | End: 2024-05-06 | Stop reason: SDUPTHER

## 2024-04-07 RX ORDER — DICYCLOMINE HYDROCHLORIDE 10 MG/1
10 CAPSULE ORAL
Qty: 120 CAPSULE | Refills: 0 | Status: SHIPPED | OUTPATIENT
Start: 2024-04-07 | End: 2024-05-19 | Stop reason: SDUPTHER

## 2024-04-16 DIAGNOSIS — M54.31 BILATERAL SCIATICA: ICD-10-CM

## 2024-04-16 DIAGNOSIS — M54.32 BILATERAL SCIATICA: ICD-10-CM

## 2024-04-16 RX ORDER — AMITRIPTYLINE HYDROCHLORIDE 10 MG/1
10 TABLET, FILM COATED ORAL NIGHTLY PRN
Qty: 60 TABLET | Refills: 0 | Status: SHIPPED | OUTPATIENT
Start: 2024-04-16 | End: 2024-06-15

## 2024-05-06 DIAGNOSIS — Z72.0 TOBACCO USE: ICD-10-CM

## 2024-05-06 DIAGNOSIS — J42 CHRONIC BRONCHITIS, UNSPECIFIED CHRONIC BRONCHITIS TYPE: ICD-10-CM

## 2024-05-06 RX ORDER — ALBUTEROL SULFATE 90 UG/1
2 AEROSOL, METERED RESPIRATORY (INHALATION) EVERY 6 HOURS PRN
Qty: 8.5 G | Refills: 0 | Status: SHIPPED | OUTPATIENT
Start: 2024-05-06 | End: 2024-05-19 | Stop reason: SDUPTHER

## 2024-05-19 DIAGNOSIS — Z72.0 TOBACCO USE: ICD-10-CM

## 2024-05-19 DIAGNOSIS — J42 CHRONIC BRONCHITIS, UNSPECIFIED CHRONIC BRONCHITIS TYPE: ICD-10-CM

## 2024-05-19 DIAGNOSIS — K58.0 IRRITABLE BOWEL SYNDROME WITH DIARRHEA: ICD-10-CM

## 2024-05-20 RX ORDER — ALBUTEROL SULFATE 0.83 MG/ML
2.5 SOLUTION RESPIRATORY (INHALATION) EVERY 6 HOURS PRN
Qty: 90 ML | Refills: 2 | Status: SHIPPED | OUTPATIENT
Start: 2024-05-20 | End: 2025-05-20

## 2024-05-20 RX ORDER — ALBUTEROL SULFATE 90 UG/1
2 AEROSOL, METERED RESPIRATORY (INHALATION) EVERY 6 HOURS PRN
Qty: 8.5 G | Refills: 0 | Status: SHIPPED | OUTPATIENT
Start: 2024-05-20

## 2024-05-20 RX ORDER — DICYCLOMINE HYDROCHLORIDE 10 MG/1
10 CAPSULE ORAL
Qty: 120 CAPSULE | Refills: 0 | Status: SHIPPED | OUTPATIENT
Start: 2024-05-20 | End: 2024-06-21

## 2024-08-26 DIAGNOSIS — M54.12 CERVICAL RADICULOPATHY: ICD-10-CM

## 2024-08-28 DIAGNOSIS — M54.12 CERVICAL RADICULOPATHY: ICD-10-CM

## 2024-08-29 RX ORDER — PREGABALIN 75 MG/1
75 CAPSULE ORAL 2 TIMES DAILY
Qty: 60 CAPSULE | Refills: 5 | OUTPATIENT
Start: 2024-08-29 | End: 2025-02-25

## 2024-09-03 RX ORDER — DULOXETIN HYDROCHLORIDE 60 MG/1
60 CAPSULE, DELAYED RELEASE ORAL DAILY
Qty: 90 CAPSULE | Refills: 3 | Status: SHIPPED | OUTPATIENT
Start: 2024-09-03 | End: 2025-08-29

## 2024-09-09 DIAGNOSIS — M54.12 CERVICAL RADICULOPATHY: ICD-10-CM

## 2024-09-09 RX ORDER — PREGABALIN 75 MG/1
75 CAPSULE ORAL 2 TIMES DAILY
Qty: 60 CAPSULE | Refills: 5 | OUTPATIENT
Start: 2024-09-09 | End: 2025-03-08